# Patient Record
Sex: MALE | Race: WHITE | NOT HISPANIC OR LATINO | URBAN - METROPOLITAN AREA
[De-identification: names, ages, dates, MRNs, and addresses within clinical notes are randomized per-mention and may not be internally consistent; named-entity substitution may affect disease eponyms.]

---

## 2017-10-30 ENCOUNTER — EMERGENCY (EMERGENCY)
Facility: HOSPITAL | Age: 57
LOS: 1 days | Discharge: HOME | End: 2017-10-30
Attending: EMERGENCY MEDICINE
Payer: MEDICARE

## 2017-10-30 VITALS
HEART RATE: 85 BPM | RESPIRATION RATE: 18 BRPM | OXYGEN SATURATION: 97 % | HEIGHT: 68 IN | DIASTOLIC BLOOD PRESSURE: 70 MMHG | SYSTOLIC BLOOD PRESSURE: 102 MMHG | WEIGHT: 177.91 LBS | TEMPERATURE: 98 F

## 2017-10-30 PROBLEM — Z00.00 ENCOUNTER FOR PREVENTIVE HEALTH EXAMINATION: Status: ACTIVE | Noted: 2017-10-30

## 2017-10-30 PROCEDURE — 99284 EMERGENCY DEPT VISIT MOD MDM: CPT | Mod: 25

## 2017-10-30 PROCEDURE — 73070 X-RAY EXAM OF ELBOW: CPT | Mod: 26,RT

## 2017-10-30 PROCEDURE — 73070 X-RAY EXAM OF ELBOW: CPT

## 2017-10-30 PROCEDURE — 73110 X-RAY EXAM OF WRIST: CPT | Mod: 26,RT

## 2017-10-30 PROCEDURE — 73110 X-RAY EXAM OF WRIST: CPT

## 2017-10-30 PROCEDURE — 99283 EMERGENCY DEPT VISIT LOW MDM: CPT

## 2017-10-30 RX ORDER — IBUPROFEN 200 MG
600 TABLET ORAL ONCE
Qty: 0 | Refills: 0 | Status: COMPLETED | OUTPATIENT
Start: 2017-10-30 | End: 2017-10-30

## 2017-10-30 RX ADMIN — Medication 600 MILLIGRAM(S): at 17:37

## 2017-10-30 NOTE — ED ADULT TRIAGE NOTE - CHIEF COMPLAINT QUOTE
'On Saturday I slipped and fell on my wrist and I used heat and cold but still have wrist and arm pain. " Pt points to wrist area up to elbow area and states that is where pain is.

## 2017-10-30 NOTE — ED STATDOCS - MUSCULOSKELETAL, MLM
Tenderness and swelling over radial aspect of right wrist. Loose . Ambulatory. No midline tenderness.

## 2017-10-30 NOTE — ED STATDOCS - ATTENDING CONTRIBUTION TO CARE
I, Jose Francisco Covington, performed the initial face to face bedside interview with this patient regarding history of present illness, review of symptoms and relevant past medical, social and family history.  I completed an independent physical examination.  I was the initial provider who evaluated this patient. I have signed out the follow up of any pending tests (i.e. labs, radiological studies) to the ACP.  I have communicated the patient’s plan of care and disposition with the ACP.

## 2017-10-30 NOTE — ED STATDOCS - PLAN OF CARE
Take ibuprofen 600 mg every 6 hours as needed for pian for 3-5 days. If symptoms persist follow up w orthopedics, referral given. Return to ED w worsening pain, numbness/tingling or other worrisome symptoms.

## 2017-10-30 NOTE — ED STATDOCS - PROGRESS NOTE DETAILS
PA NOTE: Pt seen by intake physician and HPI/ROS/PE/MDM reviewed. Pt seen and evaluated. Discussed plan and any resulted studies at this time. On exam pt is noted without wrist TTP, has FROM right wrist. Pt has TTP volar aspect ulna at elbow. Will obtain elbow XR. Re-Evaluation: Pt informed results wnl. Pt placed in sling for comfort. Advised ibuprofen 600 mg q6h for 3-5 days. Advised if pain persists can follow up w orthopaedics within 1 week. Advised return to ED w worsening pain, numbness/tingling, weakness or other worrisome symptoms.  Pt expresses  understanding and agreement with plan of discharge and follow up. NAD noted at discharge.

## 2017-10-30 NOTE — ED STATDOCS - CARE PLAN
Principal Discharge DX:	Arm contusion  Instructions for follow-up, activity and diet:	Take ibuprofen 600 mg every 6 hours as needed for pian for 3-5 days. If symptoms persist follow up w orthopedics, referral given. Return to ED w worsening pain, numbness/tingling or other worrisome symptoms.

## 2020-05-26 ENCOUNTER — EMERGENCY (EMERGENCY)
Facility: HOSPITAL | Age: 60
LOS: 1 days | Discharge: HOME | End: 2020-05-26
Attending: EMERGENCY MEDICINE
Payer: MEDICARE

## 2020-05-26 VITALS
OXYGEN SATURATION: 96 % | WEIGHT: 190.04 LBS | HEIGHT: 67 IN | HEART RATE: 88 BPM | RESPIRATION RATE: 18 BRPM | DIASTOLIC BLOOD PRESSURE: 79 MMHG | TEMPERATURE: 99 F | SYSTOLIC BLOOD PRESSURE: 113 MMHG

## 2020-05-26 PROCEDURE — 99283 EMERGENCY DEPT VISIT LOW MDM: CPT

## 2020-05-26 PROCEDURE — 99283 EMERGENCY DEPT VISIT LOW MDM: CPT | Mod: GC

## 2020-05-26 NOTE — ED STATDOCS - ATTENDING CONTRIBUTION TO CARE
itchy rash ludy forearms for up to 2 weeks.  some relief with OTC cream. no fever. no pain.  PE:  ill defined erythematous patches and scattered inflammatory papules with excoriations to dorsal surfaces of ludy forearms suggestive of an irritative dermatitis.  no toxic striations.  A/P irritant dermatitis: treatment with triamcinolone.

## 2020-05-26 NOTE — ED STATDOCS - OBJECTIVE STATEMENT
Pt is a 58yo M with PMH of MS presenting with a rash on his bilateral forearms. He reports that he think it's poison ivy. He states for the past 2 weeks he has been having intense itching and the rash. He states its only on the forearms, is not spreading. Reports using over the counter creams but without relief. Denies any pus or discharge. Denies any fever, chills, weakness. He does not recall what he was exposed to that caused the rash.

## 2020-05-26 NOTE — ED STATDOCS - NS ED ROS FT
General: Denies fever, chills  HEENT: Denies sensory changes, sore throat  Neck: Denies neck pain, neck stiffness  Resp: Denies coughing, SOB  Cardiovascular: Denies CP, palpitations, LE edema  GI: Denies nausea, vomiting, abdominal pain, diarrhea, constipation, blood in stool  : Denies dysuria, hematuria, frequency, incontinence  MSK: Endorses arm itching  Neuro: Denies HA, dizziness, numbness, weakness  Skin: Denies rashes

## 2020-05-26 NOTE — ED STATDOCS - CLINICAL SUMMARY MEDICAL DECISION MAKING FREE TEXT BOX
Patient with likely poison ivy rash, topical dermatitis. To be given topical triamcinolone to trial for symptom relief. No signs of superinfection or cellulitis.

## 2020-05-26 NOTE — ED STATDOCS - PATIENT PORTAL LINK FT
You can access the FollowMyHealth Patient Portal offered by [facility] by registering at the following website: [email or web address]. By joining [facility]’s FollowMyHealth portal, you will also be able to view your health information using other applications (apps) compatible with our system.

## 2020-05-26 NOTE — ED STATDOCS - NSFOLLOWUPINSTRUCTIONS_ED_ALL_ED_FT
Use ointment for 2 weeks or until symptoms resolve and then 3 additional days.   Keep arms clean and dry.

## 2020-05-26 NOTE — ED STATDOCS - CPE ED EYE NORM
“You can access the FollowHealth Patient Portal, offered by [facility], by registering with the following website: [email or web address]” bilateral normal...

## 2023-02-26 ENCOUNTER — TRANSCRIPTION ENCOUNTER (OUTPATIENT)
Age: 63
End: 2023-02-26

## 2023-02-26 ENCOUNTER — INPATIENT (INPATIENT)
Facility: HOSPITAL | Age: 63
LOS: 9 days | Discharge: NURSING, REHAB OR LONG-TERM CARE | DRG: 482 | End: 2023-03-08
Attending: STUDENT IN AN ORGANIZED HEALTH CARE EDUCATION/TRAINING PROGRAM | Admitting: STUDENT IN AN ORGANIZED HEALTH CARE EDUCATION/TRAINING PROGRAM
Payer: MEDICARE

## 2023-02-26 VITALS
TEMPERATURE: 98 F | HEIGHT: 69 IN | WEIGHT: 184.97 LBS | DIASTOLIC BLOOD PRESSURE: 89 MMHG | HEART RATE: 61 BPM | OXYGEN SATURATION: 98 % | RESPIRATION RATE: 18 BRPM | SYSTOLIC BLOOD PRESSURE: 137 MMHG

## 2023-02-26 DIAGNOSIS — S72.142A DISPLACED INTERTROCHANTERIC FRACTURE OF LEFT FEMUR, INITIAL ENCOUNTER FOR CLOSED FRACTURE: ICD-10-CM

## 2023-02-26 LAB
ALBUMIN SERPL ELPH-MCNC: 4.2 G/DL — SIGNIFICANT CHANGE UP (ref 3.3–5.2)
ALP SERPL-CCNC: 73 U/L — SIGNIFICANT CHANGE UP (ref 40–120)
ALT FLD-CCNC: 17 U/L — SIGNIFICANT CHANGE UP
ANION GAP SERPL CALC-SCNC: 10 MMOL/L — SIGNIFICANT CHANGE UP (ref 5–17)
APTT BLD: 30.7 SEC — SIGNIFICANT CHANGE UP (ref 27.5–35.5)
AST SERPL-CCNC: 19 U/L — SIGNIFICANT CHANGE UP
BASOPHILS # BLD AUTO: 0.03 K/UL — SIGNIFICANT CHANGE UP (ref 0–0.2)
BASOPHILS NFR BLD AUTO: 0.3 % — SIGNIFICANT CHANGE UP (ref 0–2)
BILIRUB SERPL-MCNC: 0.3 MG/DL — LOW (ref 0.4–2)
BLD GP AB SCN SERPL QL: SIGNIFICANT CHANGE UP
BUN SERPL-MCNC: 17.8 MG/DL — SIGNIFICANT CHANGE UP (ref 8–20)
CALCIUM SERPL-MCNC: 9.1 MG/DL — SIGNIFICANT CHANGE UP (ref 8.4–10.5)
CHLORIDE SERPL-SCNC: 100 MMOL/L — SIGNIFICANT CHANGE UP (ref 96–108)
CO2 SERPL-SCNC: 26 MMOL/L — SIGNIFICANT CHANGE UP (ref 22–29)
CREAT SERPL-MCNC: 0.55 MG/DL — SIGNIFICANT CHANGE UP (ref 0.5–1.3)
EGFR: 113 ML/MIN/1.73M2 — SIGNIFICANT CHANGE UP
EOSINOPHIL # BLD AUTO: 0.39 K/UL — SIGNIFICANT CHANGE UP (ref 0–0.5)
EOSINOPHIL NFR BLD AUTO: 3.9 % — SIGNIFICANT CHANGE UP (ref 0–6)
FLUAV AG NPH QL: SIGNIFICANT CHANGE UP
FLUBV AG NPH QL: SIGNIFICANT CHANGE UP
GLUCOSE SERPL-MCNC: 128 MG/DL — HIGH (ref 70–99)
HCT VFR BLD CALC: 43.7 % — SIGNIFICANT CHANGE UP (ref 39–50)
HGB BLD-MCNC: 14.3 G/DL — SIGNIFICANT CHANGE UP (ref 13–17)
IMM GRANULOCYTES NFR BLD AUTO: 0.4 % — SIGNIFICANT CHANGE UP (ref 0–0.9)
INR BLD: 1 RATIO — SIGNIFICANT CHANGE UP (ref 0.88–1.16)
LYMPHOCYTES # BLD AUTO: 2 K/UL — SIGNIFICANT CHANGE UP (ref 1–3.3)
LYMPHOCYTES # BLD AUTO: 20 % — SIGNIFICANT CHANGE UP (ref 13–44)
MCHC RBC-ENTMCNC: 30.6 PG — SIGNIFICANT CHANGE UP (ref 27–34)
MCHC RBC-ENTMCNC: 32.7 GM/DL — SIGNIFICANT CHANGE UP (ref 32–36)
MCV RBC AUTO: 93.4 FL — SIGNIFICANT CHANGE UP (ref 80–100)
MONOCYTES # BLD AUTO: 0.55 K/UL — SIGNIFICANT CHANGE UP (ref 0–0.9)
MONOCYTES NFR BLD AUTO: 5.5 % — SIGNIFICANT CHANGE UP (ref 2–14)
NEUTROPHILS # BLD AUTO: 6.98 K/UL — SIGNIFICANT CHANGE UP (ref 1.8–7.4)
NEUTROPHILS NFR BLD AUTO: 69.9 % — SIGNIFICANT CHANGE UP (ref 43–77)
PLATELET # BLD AUTO: 240 K/UL — SIGNIFICANT CHANGE UP (ref 150–400)
POTASSIUM SERPL-MCNC: 4.8 MMOL/L — SIGNIFICANT CHANGE UP (ref 3.5–5.3)
POTASSIUM SERPL-SCNC: 4.8 MMOL/L — SIGNIFICANT CHANGE UP (ref 3.5–5.3)
PROT SERPL-MCNC: 6.7 G/DL — SIGNIFICANT CHANGE UP (ref 6.6–8.7)
PROTHROM AB SERPL-ACNC: 11.6 SEC — SIGNIFICANT CHANGE UP (ref 10.5–13.4)
RBC # BLD: 4.68 M/UL — SIGNIFICANT CHANGE UP (ref 4.2–5.8)
RBC # FLD: 12.2 % — SIGNIFICANT CHANGE UP (ref 10.3–14.5)
RSV RNA NPH QL NAA+NON-PROBE: SIGNIFICANT CHANGE UP
SARS-COV-2 RNA SPEC QL NAA+PROBE: SIGNIFICANT CHANGE UP
SODIUM SERPL-SCNC: 136 MMOL/L — SIGNIFICANT CHANGE UP (ref 135–145)
WBC # BLD: 9.99 K/UL — SIGNIFICANT CHANGE UP (ref 3.8–10.5)
WBC # FLD AUTO: 9.99 K/UL — SIGNIFICANT CHANGE UP (ref 3.8–10.5)

## 2023-02-26 PROCEDURE — 71045 X-RAY EXAM CHEST 1 VIEW: CPT | Mod: 26

## 2023-02-26 PROCEDURE — 73552 X-RAY EXAM OF FEMUR 2/>: CPT | Mod: 26,LT

## 2023-02-26 PROCEDURE — 99223 1ST HOSP IP/OBS HIGH 75: CPT | Mod: 57

## 2023-02-26 PROCEDURE — 99285 EMERGENCY DEPT VISIT HI MDM: CPT

## 2023-02-26 PROCEDURE — 73502 X-RAY EXAM HIP UNI 2-3 VIEWS: CPT | Mod: 26,LT

## 2023-02-26 PROCEDURE — 93010 ELECTROCARDIOGRAM REPORT: CPT

## 2023-02-26 RX ORDER — OXYCODONE HYDROCHLORIDE 5 MG/1
5 TABLET ORAL
Refills: 0 | Status: DISCONTINUED | OUTPATIENT
Start: 2023-02-26 | End: 2023-02-27

## 2023-02-26 RX ORDER — SERTRALINE 25 MG/1
50 TABLET, FILM COATED ORAL DAILY
Refills: 0 | Status: DISCONTINUED | OUTPATIENT
Start: 2023-02-26 | End: 2023-02-27

## 2023-02-26 RX ORDER — TRANEXAMIC ACID 100 MG/ML
1000 INJECTION, SOLUTION INTRAVENOUS ONCE
Refills: 0 | Status: COMPLETED | OUTPATIENT
Start: 2023-02-26 | End: 2023-02-26

## 2023-02-26 RX ORDER — TAMSULOSIN HYDROCHLORIDE 0.4 MG/1
1 CAPSULE ORAL
Qty: 0 | Refills: 0 | DISCHARGE

## 2023-02-26 RX ORDER — ONDANSETRON 8 MG/1
4 TABLET, FILM COATED ORAL EVERY 6 HOURS
Refills: 0 | Status: DISCONTINUED | OUTPATIENT
Start: 2023-02-26 | End: 2023-02-27

## 2023-02-26 RX ORDER — MORPHINE SULFATE 50 MG/1
4 CAPSULE, EXTENDED RELEASE ORAL ONCE
Refills: 0 | Status: DISCONTINUED | OUTPATIENT
Start: 2023-02-26 | End: 2023-02-26

## 2023-02-26 RX ORDER — TAMSULOSIN HYDROCHLORIDE 0.4 MG/1
0.4 CAPSULE ORAL AT BEDTIME
Refills: 0 | Status: DISCONTINUED | OUTPATIENT
Start: 2023-02-26 | End: 2023-02-27

## 2023-02-26 RX ORDER — GLATIRAMER ACETATE 20 MG/ML
40 INJECTION, SOLUTION SUBCUTANEOUS
Qty: 0 | Refills: 0 | DISCHARGE

## 2023-02-26 RX ORDER — OXYCODONE HYDROCHLORIDE 5 MG/1
10 TABLET ORAL
Refills: 0 | Status: DISCONTINUED | OUTPATIENT
Start: 2023-02-26 | End: 2023-02-27

## 2023-02-26 RX ORDER — MUPIROCIN 20 MG/G
1 OINTMENT TOPICAL
Refills: 0 | Status: DISCONTINUED | OUTPATIENT
Start: 2023-02-26 | End: 2023-02-27

## 2023-02-26 RX ORDER — GLATIRAMER ACETATE 20 MG/ML
40 INJECTION, SOLUTION SUBCUTANEOUS
Refills: 0 | Status: DISCONTINUED | OUTPATIENT
Start: 2023-02-26 | End: 2023-02-27

## 2023-02-26 RX ORDER — ENOXAPARIN SODIUM 100 MG/ML
40 INJECTION SUBCUTANEOUS ONCE
Refills: 0 | Status: COMPLETED | OUTPATIENT
Start: 2023-02-26 | End: 2023-02-26

## 2023-02-26 RX ORDER — SODIUM CHLORIDE 9 MG/ML
1000 INJECTION INTRAMUSCULAR; INTRAVENOUS; SUBCUTANEOUS
Refills: 0 | Status: DISCONTINUED | OUTPATIENT
Start: 2023-02-26 | End: 2023-02-27

## 2023-02-26 RX ORDER — VANCOMYCIN HCL 1 G
1250 VIAL (EA) INTRAVENOUS ONCE
Refills: 0 | Status: COMPLETED | OUTPATIENT
Start: 2023-02-27 | End: 2023-02-27

## 2023-02-26 RX ORDER — POVIDONE-IODINE 5 %
1 AEROSOL (ML) TOPICAL ONCE
Refills: 0 | Status: DISCONTINUED | OUTPATIENT
Start: 2023-02-26 | End: 2023-02-27

## 2023-02-26 RX ORDER — HYDROMORPHONE HYDROCHLORIDE 2 MG/ML
4 INJECTION INTRAMUSCULAR; INTRAVENOUS; SUBCUTANEOUS
Refills: 0 | Status: DISCONTINUED | OUTPATIENT
Start: 2023-02-26 | End: 2023-02-27

## 2023-02-26 RX ORDER — CHLORHEXIDINE GLUCONATE 213 G/1000ML
1 SOLUTION TOPICAL DAILY
Refills: 0 | Status: DISCONTINUED | OUTPATIENT
Start: 2023-02-26 | End: 2023-02-27

## 2023-02-26 RX ORDER — ACETAMINOPHEN 500 MG
650 TABLET ORAL EVERY 6 HOURS
Refills: 0 | Status: DISCONTINUED | OUTPATIENT
Start: 2023-02-26 | End: 2023-02-27

## 2023-02-26 RX ORDER — SERTRALINE 25 MG/1
1 TABLET, FILM COATED ORAL
Qty: 0 | Refills: 0 | DISCHARGE

## 2023-02-26 RX ADMIN — ONDANSETRON 4 MILLIGRAM(S): 8 TABLET, FILM COATED ORAL at 23:06

## 2023-02-26 RX ADMIN — ENOXAPARIN SODIUM 40 MILLIGRAM(S): 100 INJECTION SUBCUTANEOUS at 17:01

## 2023-02-26 RX ADMIN — SODIUM CHLORIDE 150 MILLILITER(S): 9 INJECTION INTRAMUSCULAR; INTRAVENOUS; SUBCUTANEOUS at 23:10

## 2023-02-26 RX ADMIN — MORPHINE SULFATE 4 MILLIGRAM(S): 50 CAPSULE, EXTENDED RELEASE ORAL at 14:39

## 2023-02-26 RX ADMIN — Medication 650 MILLIGRAM(S): at 23:07

## 2023-02-26 RX ADMIN — MORPHINE SULFATE 4 MILLIGRAM(S): 50 CAPSULE, EXTENDED RELEASE ORAL at 15:09

## 2023-02-26 RX ADMIN — TAMSULOSIN HYDROCHLORIDE 0.4 MILLIGRAM(S): 0.4 CAPSULE ORAL at 23:06

## 2023-02-26 RX ADMIN — TRANEXAMIC ACID 220 MILLIGRAM(S): 100 INJECTION, SOLUTION INTRAVENOUS at 17:01

## 2023-02-26 NOTE — PATIENT PROFILE ADULT - FALL HARM RISK - HARM RISK INTERVENTIONS

## 2023-02-26 NOTE — PATIENT PROFILE ADULT - DO YOU LACK THE NECESSARY SUPPORT TO HELP YOU COPE WITH LIFE CHALLENGES?
Spoke with patient. She has been out of Lantus for over a week because she can not get script filled because when sugars were running high she increased on her own and was doing 30 units twice daily and increased Humalog 24 units twice daily. She still is taking the Humalog. Asked why she wasn't taking this three times daily and she said she thought you told her to only take twice daily. Has not been taking her Trulicity either as she said it does not work and she would like to go back on Ozempic as this controlled her blood sugars much better but she switched because she has nausea with it but states she would like to go back on despite this. 1.What dose of Lantus and Humalog do you want her on? We probably need to increase her Lantus so she can get a new script. 2.Also send in [address] as well. 3. Patient states Amitriptyline is not working to help her sleep and would like to be prescribed Trazadone. no

## 2023-02-26 NOTE — H&P ADULT - ASSESSMENT
Left hip IT fx  Patient will require orthopedic surgical intervention   Patient will be medically optimized and likely go to OR tomorrow 2/27/23 with Dr Escalante   Pre Op meds/fluids and NPO orders   Fluids while NPO

## 2023-02-26 NOTE — ED PROVIDER NOTE - WR ORDER ID 4
Your opinion matters! Thank you for choosing Dr. Padmini Cervantes at [facility]. You may receive a survey in the mail about today's visit.  We always appreciate feedback.  It was a pleasure to care for you today!    Dr. Cervantes's Staff:    Santiago is the Patient   Lanny is the Medical Assistant  Maritza is the Nurse   [id number]

## 2023-02-26 NOTE — ED ADULT TRIAGE NOTE - CHIEF COMPLAINT QUOTE
pt a+ox3, BIBA s/p trip and fall. pt states he was walking into garage, tip of boot got caught on step and he fell forward, directly onto left hip. LLE shortened and externally rotated.

## 2023-02-26 NOTE — ED PROVIDER NOTE - NS ED ROS FT
Constitutional: no fever, no chills  Head: NC, AT   Eyes: no redness   ENMT: no nasal congestion/drainage, no sore throat   CV: no chest pain, no edema  Resp: no cough, no dyspnea  GI: no abdominal pain, no nausea, no vomiting, no diarrhea  : no dysuria, no hematuria   Skin: no lesions, no rashes   Neuro: no LOC, no headache, no sensory deficits, no weakness  MSK: left hip pain,

## 2023-02-26 NOTE — ED PROVIDER NOTE - OBJECTIVE STATEMENT
62 y m with pmh of MS presenting for fall today after tripping on concrete lip of garage. Pt denies any head trauma, blood thinners, loc. No cp, sob, n/v, ab pain, f/c. Now having left sided hip pain. Denies any change in strength or sensation in left LE.

## 2023-02-26 NOTE — H&P ADULT - NSHPPHYSICALEXAM_GEN_ALL_CORE
Left leg shortened and externally rotated. Pulses intact and appreciated, Calves soft, supple and nontender. Pain with attempted log roll

## 2023-02-26 NOTE — ED ADULT NURSE REASSESSMENT NOTE - NS ED NURSE REASSESS COMMENT FT1
Patient reports an improvement in presence of pain. Aware of POC and admission for surgery tomorrow. Medicated as per orders. Awaiting call back from Rn on [address].

## 2023-02-26 NOTE — PATIENT PROFILE ADULT - PATIENT'S GENDER IDENTITY
FORM SENT TO DR. PRYOR PER PATIENT   Ortho Sports Medicine Shoulder Visit     Assesment:     right shoulder SLAP tear with small rotator cuff tear    Plan:    Conservative treatment:    Will send patient to see Dr Naomie Davis for right shoulder glenohumeral joint cortisone injection to see if this offers more relief than subacromial injections  See patient back in 2-3 months, if no significant improvement then may consider moving forward with surgical procedure  Maintain, HEP, nsaids as needed for pain  Imaging:    No imaging was available for review today  Injection:    No Injection planned at this time  Surgery:     No surgery is recommended at this point, continue with conservative treatment plan as noted  History of Present Illness: The patient is returns for follow up of his right shoulder, treating for SLAP tear with small rotator cuff tear  He was given CSI 7/2/21 which offered relief until about 2 weeks ago  Pain is primarily during day with work, repetitive motions, overhead activities bother his shoulder  Pain posterior-anterior shoulder  Pain is improved by rest, ice and NSAIDS  Pain is aggravated by overhead activity, reaching back and rotation  The patient denies weakness  The patient has tried rest, ice, NSAIDS, physical therapy and injection  I have reviewed the past medical, surgical, social and family history, medications and allergies as documented in the EMR  Review of systems: ROS is negative other than that noted in the HPI  Constitutional: Negative for fatigue and fever     Cardiovascular: Negative for chest pain  Pulmonary: negative for shortness of breath    PMH/PSH:  Past Medical History:   Diagnosis Date    Diabetes mellitus ([facility] [address] )     Hypertension      Past Surgical History:   Procedure Laterality Date    ELBOW SURGERY      Left    FL INJECTION RIGHT SHOULDER (ARTHROGRAM)  1/5/2021        Physical Exam:    Height 5' 9" (1 753 m), weight 113 kg (250 lb)     General/Constitutional: NAD, well developed, well nourished  HENT: Normocephalic, atraumatic  CV: Intact distal pulses, regular rate  Resp: No respiratory distress or labored breathing  Lymphatic: No lymphadenopathy palpated  Neuro: Alert and Oriented x 3, no focal deficits  Psych: Normal mood, normal affect, normal judgement, normal behavior  Skin: Warm, dry, no rashes, no erythema     Shoulder Exam (focused): Shoulder focused exam:       RIGHT LEFT    Scapula Atrophy Negative Negative     Winging Negative Negative     Protraction Negative Negative    Rotator cuff SS 5/5 5/5     IS 5/5 5/5     SubS 5/5 5/5    ROM  170     ER0 60 60     ER90 90 90     IR90 40 40     IRb T6 T6    TTP: AC Negative Negative     Biceps Negative Negative     Coracoid Negative Negative    Special Tests: O'Briens Negative Negative     Rodriguez-shear Negative Negative     Cross body Adduction Negative Negative     Speeds  Negative Negative     Elizabeth's Negative Negative     Whipple Negative Negative       Neer Negative Negative     Francisco Negative Negative    Instability: Apprehension & relocation not tested not tested     Load & shift not tested not tested    Other: Crank Negative Negative               UE NV Exam: +2 Radial pulses bilaterally  Sensation intact to light touch C5-T1 bilaterally, Radial/median/ulnar nerve motor intact    Cervical ROM is full without pain, numbness or tingling      Shoulder Imaging    No new imaging to review         Scribe Attestation    I,:  Dev Roy am acting as a scribe while in the presence of the attending physician :       I,:  Sheeba Mcdonough,  personally performed the services described in this documentation    as scribed in my presence : Male

## 2023-02-26 NOTE — H&P ADULT - NSHPLABSRESULTS_GEN_ALL_CORE
ACC: [id number] EXAM:  XR PELVIS COMPLETE MIN 3 VIEWS   ORDERED BY: MICHELLE SEGUNDO     ACC: [id number] EXAM:  XR HIP 2-3V LT   ORDERED BY: MICHELLE SEGUNDO     PROCEDURE DATE: 02/26/2023        INTERPRETATION:  History: Fall with hip pain and shortness.    FINDINGS:    Frontal pelvis  Frontal left hip  Frontal and crosstable lateral left femur:    Left intertrochanteric hip fracture with angulated foreshortening   displacement.    No hip dislocation.    No other pelvic fractures appreciated.    Distal femur intact.    IMPRESSION:    Left intertrochanteric hip fracture with angulated foreshortening   displacement.        --- End of Report ---        ALISON MELISSA MD; Attending Radiologist  This document has been electronically signed. Feb 26 2023  4:21PM

## 2023-02-26 NOTE — ED PROVIDER NOTE - ATTENDING CONTRIBUTION TO CARE
Janak: I performed a face to face evaluation of this patient and performed a full history and physical examination on the patient.  I agree with the resident's history, physical examination, and plan of the patient unless otherwise noted. My brief assessment is as follows: hx MS present s/p trip after getting toe caught in uneven surface fell onto left side. denies hitting head, no a/c. c/o pain to left hip only. shortened and externally rotated. no numbness/tingling. no other complaints. ncat, no midline neck/spine ttp, ctab, rrr, abd benign, ttp left lateral hip with rotation/shortening. nl pulse distally. xrays, pain control, preop labs, likely ortho and admit.

## 2023-02-26 NOTE — H&P ADULT - HISTORY OF PRESENT ILLNESS
Patient states he tripped in his garage today and had pain in is left hip/leg. Patient was unable to WB. Called his neighbor for help and had an ambulance called. Patient states he was diagnosed with MS at age 19. He continues to ambulate with a quad cane. He suffers from short term memory loss and he states he feels its progressing. Patient was forgetful and had hard time recalling his medications and allergies, yet a few moments later was able to recall allergy to erythromycin

## 2023-02-26 NOTE — PRE-ANESTHESIA EVALUATION ADULT - NSANTHADDINFOFT_GEN_ALL_CORE
Patient with pmh of multiple sclerosis (ambulate with a cane) reports short term memory loss. Labs, imaging, and ekg reviewed. Patient ok to proceed to the OR. Plan as per DOS anesthesiologist.

## 2023-02-26 NOTE — ED PROVIDER NOTE - PHYSICAL EXAMINATION
General: well appearing, NAD  Head: NC, AT  EENT: EOMI, no scleral icterus  Cardiac: RRR, no apparent murmurs, no lower extremity edema  Respiratory: CTABL, no respiratory distress   Abdomen: soft, ND, NT, nonperitonitic  MSK/Vascular: full ROM, soft compartments, warm extremities, left hip tenderness over upper thigh/lateral hip, 2+ DP pulse BL, no change in sensation, able to fully dorsiflect toes, small partially healed abrasion but no new skin changes at left hip.   Neuro: AAOx3, sensation to light touch intact  Psych: calm, cooperative

## 2023-02-26 NOTE — ED PROVIDER NOTE - CLINICAL SUMMARY MEDICAL DECISION MAKING FREE TEXT BOX
Likely fractured hip or proximal femur. Getting labs xrays and pain control Likely fractured hip or proximal femur. Getting labs xrays and pain control.    Xray showed intertrochanteric fracture. Continues to be neurovascularly intact. Admitting to ortho.

## 2023-02-26 NOTE — CHART NOTE - NSCHARTNOTEFT_GEN_A_CORE
PA NOTE-MEDICINE    Called by RN due to Pt Vomiting up water.  Pt states that he often feels Nauseous and vomits at home which self resolves.  61 yo Male PMHX: Multiple Sclerosis ambulates with walker fell in his Garage and felt pain to Left hip   BIBA  Diagnosed with Left intertrochanteric hip fracture with angulated foreshortening displacement.  As per Ortho Note:   Patient will require orthopedic surgical intervention   Patient will be medically optimized and likely go to OR tomorrow 2/27/23 with Dr Escalante   Pre Op meds/fluids and NPO orders     T(C): 36.8 (26 Feb 2023 21:25), Max: 36.8 (26 Feb 2023 18:41)  T(F): 98.3 (26 Feb 2023 18:41), Max: 98.3 (26 Feb 2023 18:41)  HR: 71 (26 Feb 2023 21:25) (58 - 71)  BP: 112/76 (26 Feb 2023 21:25) (112/76 - 137/89)  RR: 18 (26 Feb 2023 21:25) (18 - 18)  SpO2: 95% (26 Feb 2023 21:25) (95% - 98%) ra     General: WD WN Male sitting up in Bed NAD States + Nausea - Drank water which he vomited within a few minutes (Clear Vomitus) Denies: Abd Pain, Diarrhea No Past abd sx history Denies Difficulty swallowing   Cardiac: S1S2 + RRR  Lungs: CTA B/L A-B  Abd: NDNT Soft No Guarding, Rigidity, Rebound + BS x 4 Q   Integument: No Pallor Warm/Dry   Ext: as per Ortho     A/P Eval Pt 2/2 Vomiting up water (Multiple episodes)   NPO  Zofran already prescribed Prn-asked RN to administer dose   Continue to Monitor Pt  Recall PA if Vomiting continues or for any other changes in Pt status   Will sign out to AM Team PA NOTE-MEDICINE    Called by RN due to Pt Vomiting up water.  Pt states that he often feels Nauseous and vomits at home which self resolves.  63 yo Male PMHX: Multiple Sclerosis ambulates with walker fell in his Garage and felt pain to Left hip   BIBA  Diagnosed with Left intertrochanteric hip fracture with angulated foreshortening displacement.  As per Ortho Note:   Patient will require orthopedic surgical intervention   Patient will be medically optimized and likely go to OR tomorrow 2/27/23 with Dr Escalante   Pre Op meds/fluids and NPO orders     T(C): 36.8 (26 Feb 2023 21:25), Max: 36.8 (26 Feb 2023 18:41)  T(F): 98.3 (26 Feb 2023 18:41), Max: 98.3 (26 Feb 2023 18:41)  HR: 71 (26 Feb 2023 21:25) (58 - 71)  BP: 112/76 (26 Feb 2023 21:25) (112/76 - 137/89)  RR: 18 (26 Feb 2023 21:25) (18 - 18)  SpO2: 95% (26 Feb 2023 21:25) (95% - 98%) ra     General: WD WN Male sitting up in Bed NAD States + Nausea - Drank water which he vomited within a few minutes (Clear Vomitus) Denies: Abd Pain, Diarrhea No Past abd sx history Denies Difficulty swallowing   Cardiac: S1S2 + RRR  Lungs: CTA B/L A-B  Abd: NDNT Soft No Guarding, Rigidity, Rebound + BS x 4 Q   Integument: No Pallor Warm/Dry   Ext: as per Ortho     A/P Eval Pt 2/2 Vomiting up water (Multiple episodes)   NPO  Zofran already prescribed Prn-asked RN to administer dose   Continue to Monitor Pt  Recall PA if Vomiting continues or for any other changes in Pt status   Will call for formal Medicine consult   Will sign out to AM Team

## 2023-02-27 ENCOUNTER — TRANSCRIPTION ENCOUNTER (OUTPATIENT)
Age: 63
End: 2023-02-27

## 2023-02-27 LAB
MRSA PCR RESULT.: SIGNIFICANT CHANGE UP
S AUREUS DNA NOSE QL NAA+PROBE: SIGNIFICANT CHANGE UP

## 2023-02-27 PROCEDURE — 27245 TREAT THIGH FRACTURE: CPT | Mod: LT

## 2023-02-27 PROCEDURE — 27095 INJECTION FOR HIP X-RAY: CPT | Mod: LT

## 2023-02-27 PROCEDURE — 93010 ELECTROCARDIOGRAM REPORT: CPT

## 2023-02-27 PROCEDURE — 99222 1ST HOSP IP/OBS MODERATE 55: CPT

## 2023-02-27 DEVICE — KIT SYRINGE TRAUMACEM V PLUS STRL: Type: IMPLANTABLE DEVICE | Site: LEFT | Status: FUNCTIONAL

## 2023-02-27 DEVICE — GRAFT BONE INJ CANN TRAUMACEM FOR TFNA: Type: IMPLANTABLE DEVICE | Site: LEFT | Status: FUNCTIONAL

## 2023-02-27 DEVICE — NAIL TFNA 130DEG 10X170MM: Type: IMPLANTABLE DEVICE | Site: LEFT | Status: FUNCTIONAL

## 2023-02-27 DEVICE — GRAFT BONE INJ TRAUMACEM TM V PLUS BONE CEMENT: Type: IMPLANTABLE DEVICE | Site: LEFT | Status: FUNCTIONAL

## 2023-02-27 DEVICE — SCREW LOKG 5X36MM: Type: IMPLANTABLE DEVICE | Site: LEFT | Status: FUNCTIONAL

## 2023-02-27 DEVICE — BLADE TFNA HELICAL 100MM STRL: Type: IMPLANTABLE DEVICE | Site: LEFT | Status: FUNCTIONAL

## 2023-02-27 RX ORDER — ENOXAPARIN SODIUM 100 MG/ML
40 INJECTION SUBCUTANEOUS EVERY 24 HOURS
Refills: 0 | Status: DISCONTINUED | OUTPATIENT
Start: 2023-02-28 | End: 2023-03-08

## 2023-02-27 RX ORDER — FENTANYL CITRATE 50 UG/ML
25 INJECTION INTRAVENOUS
Refills: 0 | Status: DISCONTINUED | OUTPATIENT
Start: 2023-02-27 | End: 2023-02-27

## 2023-02-27 RX ORDER — TAMSULOSIN HYDROCHLORIDE 0.4 MG/1
0.4 CAPSULE ORAL AT BEDTIME
Refills: 0 | Status: DISCONTINUED | OUTPATIENT
Start: 2023-02-27 | End: 2023-03-08

## 2023-02-27 RX ORDER — CEFAZOLIN SODIUM 1 G
2000 VIAL (EA) INJECTION ONCE
Refills: 0 | Status: DISCONTINUED | OUTPATIENT
Start: 2023-02-27 | End: 2023-02-27

## 2023-02-27 RX ORDER — GLATIRAMER ACETATE 20 MG/ML
40 INJECTION, SOLUTION SUBCUTANEOUS
Refills: 0 | Status: DISCONTINUED | OUTPATIENT
Start: 2023-02-27 | End: 2023-03-08

## 2023-02-27 RX ORDER — SODIUM CHLORIDE 9 MG/ML
1000 INJECTION, SOLUTION INTRAVENOUS
Refills: 0 | Status: DISCONTINUED | OUTPATIENT
Start: 2023-02-27 | End: 2023-02-27

## 2023-02-27 RX ORDER — ONDANSETRON 8 MG/1
4 TABLET, FILM COATED ORAL ONCE
Refills: 0 | Status: DISCONTINUED | OUTPATIENT
Start: 2023-02-27 | End: 2023-02-27

## 2023-02-27 RX ORDER — TRANEXAMIC ACID 100 MG/ML
1000 INJECTION, SOLUTION INTRAVENOUS ONCE
Refills: 0 | Status: DISCONTINUED | OUTPATIENT
Start: 2023-02-27 | End: 2023-02-27

## 2023-02-27 RX ORDER — SODIUM CHLORIDE 9 MG/ML
1000 INJECTION, SOLUTION INTRAVENOUS
Refills: 0 | Status: DISCONTINUED | OUTPATIENT
Start: 2023-02-27 | End: 2023-02-28

## 2023-02-27 RX ORDER — OXYCODONE HYDROCHLORIDE 5 MG/1
10 TABLET ORAL EVERY 4 HOURS
Refills: 0 | Status: DISCONTINUED | OUTPATIENT
Start: 2023-02-27 | End: 2023-02-27

## 2023-02-27 RX ORDER — HYDROMORPHONE HYDROCHLORIDE 2 MG/ML
4 INJECTION INTRAMUSCULAR; INTRAVENOUS; SUBCUTANEOUS EVERY 4 HOURS
Refills: 0 | Status: DISCONTINUED | OUTPATIENT
Start: 2023-02-27 | End: 2023-02-27

## 2023-02-27 RX ORDER — ONDANSETRON 8 MG/1
4 TABLET, FILM COATED ORAL EVERY 6 HOURS
Refills: 0 | Status: DISCONTINUED | OUTPATIENT
Start: 2023-02-27 | End: 2023-03-08

## 2023-02-27 RX ORDER — OXYCODONE HYDROCHLORIDE 5 MG/1
5 TABLET ORAL ONCE
Refills: 0 | Status: DISCONTINUED | OUTPATIENT
Start: 2023-02-27 | End: 2023-02-27

## 2023-02-27 RX ORDER — ACETAMINOPHEN 500 MG
650 TABLET ORAL EVERY 6 HOURS
Refills: 0 | Status: DISCONTINUED | OUTPATIENT
Start: 2023-02-27 | End: 2023-03-08

## 2023-02-27 RX ORDER — SERTRALINE 25 MG/1
50 TABLET, FILM COATED ORAL DAILY
Refills: 0 | Status: DISCONTINUED | OUTPATIENT
Start: 2023-02-27 | End: 2023-03-08

## 2023-02-27 RX ORDER — OXYCODONE HYDROCHLORIDE 5 MG/1
5 TABLET ORAL EVERY 4 HOURS
Refills: 0 | Status: DISCONTINUED | OUTPATIENT
Start: 2023-02-27 | End: 2023-02-28

## 2023-02-27 RX ORDER — CEFAZOLIN SODIUM 1 G
2000 VIAL (EA) INJECTION
Refills: 0 | Status: COMPLETED | OUTPATIENT
Start: 2023-02-27 | End: 2023-02-28

## 2023-02-27 RX ADMIN — Medication 166.67 MILLIGRAM(S): at 11:58

## 2023-02-27 RX ADMIN — Medication 650 MILLIGRAM(S): at 11:57

## 2023-02-27 RX ADMIN — SODIUM CHLORIDE 100 MILLILITER(S): 9 INJECTION, SOLUTION INTRAVENOUS at 21:26

## 2023-02-27 RX ADMIN — Medication 650 MILLIGRAM(S): at 07:19

## 2023-02-27 RX ADMIN — GLATIRAMER ACETATE 40 MILLIGRAM(S): 20 INJECTION, SOLUTION SUBCUTANEOUS at 10:30

## 2023-02-27 RX ADMIN — TAMSULOSIN HYDROCHLORIDE 0.4 MILLIGRAM(S): 0.4 CAPSULE ORAL at 21:26

## 2023-02-27 RX ADMIN — Medication 2000 MILLIGRAM(S): at 21:26

## 2023-02-27 RX ADMIN — Medication 650 MILLIGRAM(S): at 06:19

## 2023-02-27 RX ADMIN — MUPIROCIN 1 APPLICATION(S): 20 OINTMENT TOPICAL at 06:18

## 2023-02-27 RX ADMIN — Medication 650 MILLIGRAM(S): at 00:07

## 2023-02-27 RX ADMIN — SODIUM CHLORIDE 150 MILLILITER(S): 9 INJECTION INTRAMUSCULAR; INTRAVENOUS; SUBCUTANEOUS at 06:18

## 2023-02-27 NOTE — DISCHARGE NOTE PROVIDER - PROVIDER TOKENS
PROVIDER:[TOKEN:[[id number]]] PROVIDER:[TOKEN:[[id number]]],PROVIDER:[TOKEN:[[id number]]]

## 2023-02-27 NOTE — CONSULT NOTE ADULT - ASSESSMENT
61 y/o M with Hx of MS on Copaxone 3 times a week, he uses cane to help him walk, he tripped in his garage on concrete floor and started having pain in is left hip/leg, he called his neighbor for help and had an ambulance called, he denies any head injury or neck pain, has no chest pain, sob, medicine consulted for medical optimization for surgery, patient denies any Hx of exertional dyspnea or chest pain, his METS score is 2, RCRI is 0, patient has stable vitals, labs reviewed, patient is at intermediate risk for perioperative cardiovascular complications and is optimized from the medicine point of view for planned procedure, TTE ordered, will follow before proceeding for procedure.     Plan:     Left hip fracture due to mechanical fall:     Pain meds and DVT prophylaxis as per primary team   IV fluids if NPO   Bowel meds   IS    Hx of MS: continue with his home medication.

## 2023-02-27 NOTE — CHART NOTE - NSCHARTNOTEFT_GEN_A_CORE
Orthopaedic Trauma Surgeon Addendum:    I have personally performed a face-to-face diagnostic evaluation on this patient.  I have reviewed the physician assistant note and agree with the history, exam, and plan of care, except as noted.    Planning for OR today 2/27 for IMN left IT fx    Gamal Rouse MD  Orthopaedic Trauma Surgeon  [facility]

## 2023-02-27 NOTE — PROGRESS NOTE ADULT - SUBJECTIVE AND OBJECTIVE BOX
Ortho Post Op Check    Name: IOANA LITTLE  MR #: [id number]    Procedure: Left Anterograde Femur intramedullary nail fixation   Surgeon: Dr. Rouse    Pt comfortable without complaints, pain controlled  Denies CP, SOB, N/V, numbness/tingling     General Exam:  Vital Signs Last 24 Hrs  T(C): 36.7 (02-27-23 @ 18:13), Max: 36.9 (02-27-23 @ 17:45)  T(F): 98.1 (02-27-23 @ 18:13), Max: 98.5 (02-27-23 @ 17:45)  HR: 77 (02-27-23 @ 18:13) (70 - 78)  BP: 153/92 (02-27-23 @ 18:13) (114/73 - 153/92)  BP(mean): 104 (02-27-23 @ 17:45) (84 - 104)  RR: 18 (02-27-23 @ 18:13) (12 - 20)  SpO2: 96% (02-27-23 @ 18:13) (96% - 99%)    General: Pt Alert and oriented, NAD, controlled pain.  Dressings C/D/I. No bleeding.  Pulses: 2+ dorsalis pedis pulse. Cap refill < 2 sec.  Sensation: Grossly intact to light touch without deficit.  Motor: + EHL/FHL/TA/GS    _ talbot present due to retention      A/P: 62yMale POD#0 s/p Left Hip intramedullary nail fixation   - Pain Control  - DVT ppx: Lovenox 2/28  - Post op abx: as per SCIP  - PT eval pending  - Talbot as per medicine  - Weight bearing status: WBAT Ortho Post Op Check    Name: IOANA LITTLE  MR #: [id number]    Procedure: Left Anterograde Femur intramedullary nail fixation   Surgeon: Dr. Rouse    Pt comfortable without complaints, pain controlled  Denies CP, SOB, N/V, numbness/tingling     General Exam:  Vital Signs Last 24 Hrs  T(C): 36.7 (02-27-23 @ 18:13), Max: 36.9 (02-27-23 @ 17:45)  T(F): 98.1 (02-27-23 @ 18:13), Max: 98.5 (02-27-23 @ 17:45)  HR: 77 (02-27-23 @ 18:13) (70 - 78)  BP: 153/92 (02-27-23 @ 18:13) (114/73 - 153/92)  BP(mean): 104 (02-27-23 @ 17:45) (84 - 104)  RR: 18 (02-27-23 @ 18:13) (12 - 20)  SpO2: 96% (02-27-23 @ 18:13) (96% - 99%)    General: Pt Alert and oriented, NAD, controlled pain.  Dressings C/D/I. No bleeding.  Pulses: 2+ dorsalis pedis pulse. Cap refill < 2 sec.  Sensation: Grossly intact to light touch without deficit.  Motor: + EHL/FHL/TA/GS    _ talbot present due to urinary retention      A/P: 62yMale POD#0 s/p Left Hip intramedullary nail fixation   - Pain Control  - DVT ppx: Lovenox 2/28  - Post op abx: as per SCIP  - PT eval pending  - Talbot as per medicine  - Weight bearing status: WBAT

## 2023-02-27 NOTE — DISCHARGE NOTE PROVIDER - CARE PROVIDER_API CALL
Gamal Rouse)  Orthopaedic Surgery  [address] [place] [address]  Phone: [phone]  Fax: [phone]  Follow Up Time:    Gamal Rouse)  Orthopaedic Surgery  [address] [place] [address]  Phone: [phone]  Fax: [phone]  Follow Up Time:     Regis Ladd)  Urology  [address], [address]  [place] [address]  Phone: [phone]  Fax: [phone]  Follow Up Time:

## 2023-02-27 NOTE — DISCHARGE NOTE PROVIDER - NSDCCPTREATMENT_GEN_ALL_CORE_FT
PRINCIPAL PROCEDURE  Procedure: Antegrade intertrochanteric nailing of femur  Findings and Treatment:

## 2023-02-27 NOTE — DISCHARGE NOTE PROVIDER - HOSPITAL COURSE
The patient underwent a LEFT INTRAMEDULARY NAIL FIXATION on 2/28 for treatment of a hip fracture. The patient received antibiotics consistent with SCIP guidelines. The patient was medically cleared and underwent the procedure and had no intra-operative complications. Post-operatively, the patient was seen by medicine and PT. The patient received LOVENOX for DVTP. The patient received pain medications per orthopedic pain management pathway and the pain was appropriately controlled. Patient was evaluated by PT and instructed on gait training. The patient was FULL weight bearing. The patient did not have any post-operative medical complications. The patient was discharged in stable condition.

## 2023-02-27 NOTE — DISCHARGE NOTE PROVIDER - NSDCFUADDINST_GEN_ALL_CORE_FT
The patient will be seen in the office between 2-3 weeks for wound check. Patient may shower after post-op day #3. The dressing is to be removed on post-op day #7. IF THE DRESSING BECOMES SOILED BEFORE THE REMOVAL DATE, CHANGE WITH A SIMILAR DRESSING. IF THE DRESSING BECOMES STAINED WITH DISCHARGE, CONTACT THE OFFICE FOR FURTHER DIRECTIONS.  The patient will contact the office if the wound becomes red, has increasing pain, develops bleeding or discharge, an injury occurs, or has other concerns. The patient will continue PT for gait training. The patient will continue LOVENOX for 4 weeks for blood clot prevention. The patient will take OXYCODONE AND TYLENOL for pain control and titrate according to prescription and patient needs. The patient will take Senna-S while taking oxycodone to prevent narcotic associated constipation.  Additionally, increase water intake (drink at least 8 glasses of water daily) and try adding fiber to the diet by eating fruits, vegetables and foods that are rich in grains. If constipation is experienced, contact the medical/primary care provider to discuss further treatment options. The patient is FULL weight bearing.  The patient will be seen in the office between 2-3 weeks for wound check. Patient may shower after post-op day #3. The dressing is to be removed on 3/13. IF THE DRESSING BECOMES SOILED BEFORE THE REMOVAL DATE, CHANGE WITH A SIMILAR DRESSING. IF THE DRESSING BECOMES STAINED WITH DISCHARGE, CONTACT THE OFFICE FOR FURTHER DIRECTIONS.  The patient will contact the office if the wound becomes red, has increasing pain, develops bleeding or discharge, an injury occurs, or has other concerns. The patient will continue PT for gait training. The patient will continue LOVENOX for 4 weeks for blood clot prevention. The patient will take OXYCODONE AND TYLENOL for pain control and titrate according to prescription and patient needs. The patient will take Senna-S while taking oxycodone to prevent narcotic associated constipation.  Additionally, increase water intake (drink at least 8 glasses of water daily) and try adding fiber to the diet by eating fruits, vegetables and foods that are rich in grains. If constipation is experienced, contact the medical/primary care provider to discuss further treatment options. The patient is FULL weight bearing.  The patient will be seen in the office between 2-3 weeks for wound check. Patient may shower after post-op day #3. The dressing is to be removed on 3/13. IF THE DRESSING BECOMES SOILED BEFORE THE REMOVAL DATE, CHANGE WITH A SIMILAR DRESSING. IF THE DRESSING BECOMES STAINED WITH DISCHARGE, CONTACT THE OFFICE FOR FURTHER DIRECTIONS.  The patient will contact the office if the wound becomes red, has increasing pain, develops bleeding or discharge, an injury occurs, or has other concerns. The patient will continue PT for gait training. The patient will continue LOVENOX for 4 weeks for blood clot prevention. The patient will take OXYCODONE AND TYLENOL for pain control and titrate according to prescription and patient needs. The patient will take Senna-S while taking oxycodone to prevent narcotic associated constipation.  Additionally, increase water intake (drink at least 8 glasses of water daily) and try adding fiber to the diet by eating fruits, vegetables and foods that are rich in grains. If constipation is experienced, contact the medical/primary care provider to discuss further treatment options. The patient is FULL weight bearing.       Follow-up with UROLOGIST WITHIN 3 days of discharge for talbot management.

## 2023-02-27 NOTE — DISCHARGE NOTE PROVIDER - CARE PROVIDERS DIRECT ADDRESSES
,[email or web address]

## 2023-02-27 NOTE — DISCHARGE NOTE PROVIDER - NSDCMRMEDTOKEN_GEN_ALL_CORE_FT
Copaxone 40 mg/mL subcutaneous solution: 40 milligram(s) subcutaneous 3 times a week  sertraline 50 mg oral tablet: 1 tab(s) orally once a day  tamsulosin 0.4 mg oral capsule: 1 cap(s) orally once a day  triamcinolone 0.1% topical ointment: Apply topically to affected area 3 times a day    acetaminophen 325 mg oral tablet: 2 tab(s) orally every 6 hours, As needed, Temp greater or equal to 38C (100.4F)  Copaxone 40 mg/mL subcutaneous solution: 40 milligram(s) subcutaneous 3 times a week  enoxaparin: 40 milligram(s) subcutaneous once a day for 4 weeks post-op for DVTP  oxyCODONE 10 mg oral tablet: 1 tab(s) orally every 4 hours, As needed, Moderate Pain (4 - 6)  oxyCODONE 5 mg oral tablet: 1 tab(s) orally every 4 hours, As needed, Mild Pain (1 - 3)  sertraline 50 mg oral tablet: 1 tab(s) orally once a day  tamsulosin 0.4 mg oral capsule: 1 cap(s) orally once a day  triamcinolone 0.1% topical ointment: Apply topically to affected area 3 times a day

## 2023-02-28 LAB
ANION GAP SERPL CALC-SCNC: 12 MMOL/L — SIGNIFICANT CHANGE UP (ref 5–17)
BASOPHILS # BLD AUTO: 0.02 K/UL — SIGNIFICANT CHANGE UP (ref 0–0.2)
BASOPHILS NFR BLD AUTO: 0.1 % — SIGNIFICANT CHANGE UP (ref 0–2)
BUN SERPL-MCNC: 11.2 MG/DL — SIGNIFICANT CHANGE UP (ref 8–20)
CALCIUM SERPL-MCNC: 9 MG/DL — SIGNIFICANT CHANGE UP (ref 8.4–10.5)
CHLORIDE SERPL-SCNC: 101 MMOL/L — SIGNIFICANT CHANGE UP (ref 96–108)
CO2 SERPL-SCNC: 25 MMOL/L — SIGNIFICANT CHANGE UP (ref 22–29)
CREAT SERPL-MCNC: 0.65 MG/DL — SIGNIFICANT CHANGE UP (ref 0.5–1.3)
EGFR: 108 ML/MIN/1.73M2 — SIGNIFICANT CHANGE UP
EOSINOPHIL # BLD AUTO: 0.01 K/UL — SIGNIFICANT CHANGE UP (ref 0–0.5)
EOSINOPHIL NFR BLD AUTO: 0.1 % — SIGNIFICANT CHANGE UP (ref 0–6)
GLUCOSE SERPL-MCNC: 120 MG/DL — HIGH (ref 70–99)
HCT VFR BLD CALC: 34.8 % — LOW (ref 39–50)
HCV AB S/CO SERPL IA: 0.04 S/CO — SIGNIFICANT CHANGE UP (ref 0–0.99)
HCV AB SERPL-IMP: SIGNIFICANT CHANGE UP
HGB BLD-MCNC: 11.5 G/DL — LOW (ref 13–17)
IMM GRANULOCYTES NFR BLD AUTO: 0.4 % — SIGNIFICANT CHANGE UP (ref 0–0.9)
LYMPHOCYTES # BLD AUTO: 1.72 K/UL — SIGNIFICANT CHANGE UP (ref 1–3.3)
LYMPHOCYTES # BLD AUTO: 12.6 % — LOW (ref 13–44)
MCHC RBC-ENTMCNC: 30.7 PG — SIGNIFICANT CHANGE UP (ref 27–34)
MCHC RBC-ENTMCNC: 33 GM/DL — SIGNIFICANT CHANGE UP (ref 32–36)
MCV RBC AUTO: 92.8 FL — SIGNIFICANT CHANGE UP (ref 80–100)
MONOCYTES # BLD AUTO: 1.03 K/UL — HIGH (ref 0–0.9)
MONOCYTES NFR BLD AUTO: 7.6 % — SIGNIFICANT CHANGE UP (ref 2–14)
NEUTROPHILS # BLD AUTO: 10.8 K/UL — HIGH (ref 1.8–7.4)
NEUTROPHILS NFR BLD AUTO: 79.2 % — HIGH (ref 43–77)
PLATELET # BLD AUTO: 207 K/UL — SIGNIFICANT CHANGE UP (ref 150–400)
POTASSIUM SERPL-MCNC: 4.9 MMOL/L — SIGNIFICANT CHANGE UP (ref 3.5–5.3)
POTASSIUM SERPL-SCNC: 4.9 MMOL/L — SIGNIFICANT CHANGE UP (ref 3.5–5.3)
RBC # BLD: 3.75 M/UL — LOW (ref 4.2–5.8)
RBC # FLD: 12.3 % — SIGNIFICANT CHANGE UP (ref 10.3–14.5)
SODIUM SERPL-SCNC: 138 MMOL/L — SIGNIFICANT CHANGE UP (ref 135–145)
WBC # BLD: 13.63 K/UL — HIGH (ref 3.8–10.5)
WBC # FLD AUTO: 13.63 K/UL — HIGH (ref 3.8–10.5)

## 2023-02-28 PROCEDURE — 99232 SBSQ HOSP IP/OBS MODERATE 35: CPT

## 2023-02-28 RX ADMIN — SERTRALINE 50 MILLIGRAM(S): 25 TABLET, FILM COATED ORAL at 10:28

## 2023-02-28 RX ADMIN — SODIUM CHLORIDE 100 MILLILITER(S): 9 INJECTION, SOLUTION INTRAVENOUS at 05:30

## 2023-02-28 RX ADMIN — ONDANSETRON 4 MILLIGRAM(S): 8 TABLET, FILM COATED ORAL at 01:24

## 2023-02-28 RX ADMIN — ENOXAPARIN SODIUM 40 MILLIGRAM(S): 100 INJECTION SUBCUTANEOUS at 05:29

## 2023-02-28 RX ADMIN — OXYCODONE HYDROCHLORIDE 5 MILLIGRAM(S): 5 TABLET ORAL at 22:42

## 2023-02-28 RX ADMIN — Medication 2000 MILLIGRAM(S): at 05:29

## 2023-02-28 NOTE — PROGRESS NOTE ADULT - SUBJECTIVE AND OBJECTIVE BOX
IOANA ANABEL    [id number]    History:  The patient is status post left hip IMN, POD #1. Patient is doing well. The patient's pain is controlled using the prescribed pain medications. Denies nausea, vomiting, chest pain, shortness of breath, abdominal pain or fever. No new complaints. No acute motor or sensory changes are reported. Unable to void. Had straight cath last night. Still retaining this morning and attempting to void on own. Will get talbot if unable to void.     Vital Signs Last 24 Hrs  T(C): 37.2 (28 Feb 2023 03:33), Max: 37.2 (27 Feb 2023 21:55)  T(F): 99 (28 Feb 2023 03:33), Max: 99 (28 Feb 2023 03:33)  HR: 88 (28 Feb 2023 03:33) (67 - 88)  BP: 113/70 (28 Feb 2023 03:33) (109/78 - 153/92)  BP(mean): 104 (27 Feb 2023 17:45) (84 - 104)  RR: 18 (28 Feb 2023 03:33) (12 - 20)  SpO2: 93% (28 Feb 2023 03:33) (93% - 99%)    Parameters below as of 28 Feb 2023 03:33  Patient On (Oxygen Delivery Method): room air      I&O's Summary    27 Feb 2023 07:01  -  28 Feb 2023 07:00  --------------------------------------------------------  IN: 150 mL / OUT: 1600 mL / NET: -1450 mL                              11.5   13.63 )-----------( 207      ( 28 Feb 2023 05:18 )             34.8     02-28    138  |  101  |  11.2  ----------------------------<  120<H>  4.9   |  25.0  |  0.65    Ca    9.0      28 Feb 2023 05:18    TPro  6.7  /  Alb  4.2  /  TBili  0.3<L>  /  DBili  x   /  AST  19  /  ALT  17  /  AlkPhos  73  02-26      MEDICATIONS  (STANDING):  enoxaparin Injectable 40 milliGRAM(s) SubCutaneous every 24 hours  glatiramer Injectable 40 milliGRAM(s) SubCutaneous <User Schedule>  lactated ringers. 1000 milliLiter(s) (100 mL/Hr) IV Continuous <Continuous>  sertraline 50 milliGRAM(s) Oral daily  tamsulosin 0.4 milliGRAM(s) Oral at bedtime    MEDICATIONS  (PRN):  acetaminophen     Tablet .. 650 milliGRAM(s) Oral every 6 hours PRN Temp greater or equal to 38C (100.4F)  HYDROmorphone   Tablet 4 milliGRAM(s) Oral every 4 hours PRN Severe Pain (7 - 10)  ondansetron Injectable 4 milliGRAM(s) IV Push every 6 hours PRN Nausea and/or Vomiting  oxyCODONE    IR 10 milliGRAM(s) Oral every 4 hours PRN Moderate Pain (4 - 6)  oxyCODONE    IR 5 milliGRAM(s) Oral every 4 hours PRN Mild Pain (1 - 3)      Physical exam: Lying in bed in NAD, awake and alert.  Left lower extremity- The mepilex dressings are clean, dry and intact. No wound erythema, discharge, drainage is noted. Thigh soft and compressible. Calf soft. No calf tenderness. Sensation to light touch is grossly intact distally. Motor function distally is 5/5. +EHL/FHL. No foot drop. 2+ dorsalis pedis pulse. Capillary refill is less than 2 seconds. No cyanosis.    Primary Orthopedic Assessment:  • S/P left hp IMN, POD#1    Plan:   • DVT prophylaxis as prescribed- Lovenox, including use of compression devices and ankle pumps  • Continue physical therapy  • WBAT  • Pain control as clinically indicated  • Incentive spirometry encouraged  - medicine following  - Una  • Discharge planning

## 2023-02-28 NOTE — OCCUPATIONAL THERAPY INITIAL EVALUATION ADULT - ADDITIONAL COMMENTS
Pt has tub with doors and grab bars and grab bars by toilet   Pt owns a shower chair, quad cane, RW  Pt is right handed

## 2023-02-28 NOTE — PROGRESS NOTE ADULT - ASSESSMENT
61 y/o M with Hx of MS on Copaxone 3 times a week, he uses cane to help him walk, he tripped in his garage on concrete floor and started having pain in is left hip/leg, he called his neighbor for help and had an ambulance called, he denies any head injury or neck pain, has no chest pain, sob, medicine consulted for medical optimization for surgery, patient denies any Hx of exertional dyspnea or chest pain, his METS score is 2, RCRI is 0, patient has stable vitals, labs reviewed, patient is at intermediate risk for perioperative cardiovascular complications and is optimized from the medicine point of view for planned procedure, TTE ordered, will follow before proceeding for procedure.     Plan:     Left hip fracture due to mechanical fall s/p THR post op day 01:     - post op no complications  - VS stable  - abx per ortho   - IV fluids   - opiate induced constipation regimen   - encouraging incentive spirometry   -c/w local wound care per ortho   -DVT prophylaxis and Pain meds as per Ortho team   -PT/OT and weight bearing per ortho    IS    Hx of MS: continue with his home medication.     Leucocytosis: Likely post op, no focus of infection, no fever, no chills.      63 y/o M with Hx of MS on Copaxone 3 times a week, he uses cane to help him walk, he tripped in his garage on concrete floor and started having pain in is left hip/leg, he called his neighbor for help and had an ambulance called, he denies any head injury or neck pain, has no chest pain, sob, medicine consulted for medical optimization for surgery, patient denies any Hx of exertional dyspnea or chest pain, his METS score is 2, RCRI is 0, patient has stable vitals, labs reviewed, patient is at intermediate risk for perioperative cardiovascular complications and is optimized from the medicine point of view for planned procedure, TTE ordered, will follow before proceeding for procedure.     Plan:     Left hip fracture due to mechanical fall s/p THR post op day 01:     - post op no complications  - VS stable  - abx per ortho   - IV fluids   - opiate induced constipation regimen   - encouraging incentive spirometry   -c/w local wound care per ortho   -DVT prophylaxis and Pain meds as per Ortho team   -PT/OT and weight bearing per ortho    IS    Hx of MS: continue with his home medication.     Leucocytosis: Likely post op, no focus of infection, no fever, no chills.     BPH: On Flomax has urine retention s/p straight cath, unable to void, talbot's placed.

## 2023-02-28 NOTE — PROGRESS NOTE ADULT - SUBJECTIVE AND OBJECTIVE BOX
IOANA LITTLE    [id number]    62y      Male    Patient is a 62y old  Male who presents with a chief complaint of fall (28 Feb 2023 08:52)      INTERVAL HPI/OVERNIGHT EVENTS:    Patient is doing ok, pain is well managed with pain medications, working well with PT     REVIEW OF SYSTEMS:    CONSTITUTIONAL: No fever, fatigue  RESPIRATORY: No cough, No shortness of breath  CARDIOVASCULAR: No chest pain, palpitations  GASTROINTESTINAL: No abdominal, No nausea, vomiting  NEUROLOGICAL: No headaches,  loss of strength.  MISCELLANEOUS: Left hip pain is well controlled       Vital Signs Last 24 Hrs  T(C): 36.8 (28 Feb 2023 10:36), Max: 37.2 (27 Feb 2023 21:55)  T(F): 98.2 (28 Feb 2023 10:36), Max: 99 (28 Feb 2023 03:33)  HR: 98 (28 Feb 2023 10:36) (70 - 98)  BP: 90/55 (28 Feb 2023 10:36) (90/55 - 153/92)  BP(mean): 104 (27 Feb 2023 17:45) (84 - 104)  RR: 18 (28 Feb 2023 10:36) (12 - 20)  SpO2: 93% (28 Feb 2023 10:36) (93% - 99%)    Parameters below as of 28 Feb 2023 10:36  Patient On (Oxygen Delivery Method): room air        PHYSICAL EXAM:    GENERAL: Middle age male looking comfortable   HEENT: PERRL, +EOMI  NECK: soft, Supple, No JVD   CHEST/LUNG: Clear to auscultate bilaterally; No wheezing  HEART: S1S2+, Regular rate and rhythm; No murmurs  ABDOMEN: Soft, Nontender, Nondistended; Bowel sounds present  EXTREMITIES:  1+ Peripheral Pulses, No edema, left hip with clean dressings on, no bleeding or soaking   SKIN: No rashes or lesions  NEURO: AAOX3  PSYCH: normal mood      LABS:                        11.5   13.63 )-----------( 207      ( 28 Feb 2023 05:18 )             34.8     02-28    138  |  101  |  11.2  ----------------------------<  120<H>  4.9   |  25.0  |  0.65    Ca    9.0      28 Feb 2023 05:18    TPro  6.7  /  Alb  4.2  /  TBili  0.3<L>  /  DBili  x   /  AST  19  /  ALT  17  /  AlkPhos  73  02-26    PT/INR - ( 26 Feb 2023 14:42 )   PT: 11.6 sec;   INR: 1.00 ratio         PTT - ( 26 Feb 2023 14:42 )  PTT:30.7 sec        I&O's Summary    27 Feb 2023 07:01  -  28 Feb 2023 07:00  --------------------------------------------------------  IN: 150 mL / OUT: 1600 mL / NET: -1450 mL    28 Feb 2023 07:01  -  28 Feb 2023 11:56  --------------------------------------------------------  IN: 980 mL / OUT: 0 mL / NET: 980 mL        MEDICATIONS  (STANDING):  enoxaparin Injectable 40 milliGRAM(s) SubCutaneous every 24 hours  glatiramer Injectable 40 milliGRAM(s) SubCutaneous <User Schedule>  lactated ringers. 1000 milliLiter(s) (100 mL/Hr) IV Continuous <Continuous>  sertraline 50 milliGRAM(s) Oral daily  tamsulosin 0.4 milliGRAM(s) Oral at bedtime    MEDICATIONS  (PRN):  acetaminophen     Tablet .. 650 milliGRAM(s) Oral every 6 hours PRN Temp greater or equal to 38C (100.4F)  HYDROmorphone   Tablet 4 milliGRAM(s) Oral every 4 hours PRN Severe Pain (7 - 10)  ondansetron Injectable 4 milliGRAM(s) IV Push every 6 hours PRN Nausea and/or Vomiting  oxyCODONE    IR 10 milliGRAM(s) Oral every 4 hours PRN Moderate Pain (4 - 6)  oxyCODONE    IR 5 milliGRAM(s) Oral every 4 hours PRN Mild Pain (1 - 3)

## 2023-03-01 LAB
ANION GAP SERPL CALC-SCNC: 10 MMOL/L — SIGNIFICANT CHANGE UP (ref 5–17)
BUN SERPL-MCNC: 13.3 MG/DL — SIGNIFICANT CHANGE UP (ref 8–20)
CALCIUM SERPL-MCNC: 8.3 MG/DL — LOW (ref 8.4–10.5)
CHLORIDE SERPL-SCNC: 100 MMOL/L — SIGNIFICANT CHANGE UP (ref 96–108)
CO2 SERPL-SCNC: 26 MMOL/L — SIGNIFICANT CHANGE UP (ref 22–29)
CREAT SERPL-MCNC: 0.56 MG/DL — SIGNIFICANT CHANGE UP (ref 0.5–1.3)
EGFR: 113 ML/MIN/1.73M2 — SIGNIFICANT CHANGE UP
GLUCOSE BLDC GLUCOMTR-MCNC: 112 MG/DL — HIGH (ref 70–99)
GLUCOSE SERPL-MCNC: 117 MG/DL — HIGH (ref 70–99)
POTASSIUM SERPL-MCNC: 3.4 MMOL/L — LOW (ref 3.5–5.3)
POTASSIUM SERPL-SCNC: 3.4 MMOL/L — LOW (ref 3.5–5.3)
SODIUM SERPL-SCNC: 136 MMOL/L — SIGNIFICANT CHANGE UP (ref 135–145)

## 2023-03-01 PROCEDURE — 99232 SBSQ HOSP IP/OBS MODERATE 35: CPT

## 2023-03-01 PROCEDURE — 99223 1ST HOSP IP/OBS HIGH 75: CPT

## 2023-03-01 RX ORDER — ACETAMINOPHEN 500 MG
2 TABLET ORAL
Qty: 0 | Refills: 0 | DISCHARGE
Start: 2023-03-01

## 2023-03-01 RX ORDER — METOCLOPRAMIDE HCL 10 MG
5 TABLET ORAL ONCE
Refills: 0 | Status: COMPLETED | OUTPATIENT
Start: 2023-03-01 | End: 2023-03-01

## 2023-03-01 RX ORDER — POTASSIUM CHLORIDE 20 MEQ
40 PACKET (EA) ORAL ONCE
Refills: 0 | Status: COMPLETED | OUTPATIENT
Start: 2023-03-01 | End: 2023-03-01

## 2023-03-01 RX ORDER — OXYCODONE HYDROCHLORIDE 5 MG/1
1 TABLET ORAL
Qty: 0 | Refills: 0 | DISCHARGE
Start: 2023-03-01

## 2023-03-01 RX ORDER — SODIUM CHLORIDE 9 MG/ML
1000 INJECTION, SOLUTION INTRAVENOUS
Refills: 0 | Status: DISCONTINUED | OUTPATIENT
Start: 2023-03-01 | End: 2023-03-08

## 2023-03-01 RX ORDER — ENOXAPARIN SODIUM 100 MG/ML
40 INJECTION SUBCUTANEOUS
Qty: 0 | Refills: 0 | DISCHARGE
Start: 2023-03-01

## 2023-03-01 RX ADMIN — Medication 5 MILLIGRAM(S): at 11:53

## 2023-03-01 RX ADMIN — SERTRALINE 50 MILLIGRAM(S): 25 TABLET, FILM COATED ORAL at 11:54

## 2023-03-01 RX ADMIN — TAMSULOSIN HYDROCHLORIDE 0.4 MILLIGRAM(S): 0.4 CAPSULE ORAL at 21:11

## 2023-03-01 RX ADMIN — ONDANSETRON 4 MILLIGRAM(S): 8 TABLET, FILM COATED ORAL at 09:49

## 2023-03-01 RX ADMIN — Medication 40 MILLIEQUIVALENT(S): at 16:34

## 2023-03-01 RX ADMIN — ENOXAPARIN SODIUM 40 MILLIGRAM(S): 100 INJECTION SUBCUTANEOUS at 05:32

## 2023-03-01 RX ADMIN — GLATIRAMER ACETATE 40 MILLIGRAM(S): 20 INJECTION, SOLUTION SUBCUTANEOUS at 09:45

## 2023-03-01 RX ADMIN — SODIUM CHLORIDE 100 MILLILITER(S): 9 INJECTION, SOLUTION INTRAVENOUS at 12:38

## 2023-03-01 NOTE — CONSULT NOTE ADULT - SUBJECTIVE AND OBJECTIVE BOX
62yM was admitted on 02-26 after a mechanical fall and sustaining a left IT fracture. He is s/p IMN.     Imaging Reviewed:  LEFT HIP & LEFT FEMUR 2/26 - Left intertrochanteric hip fracture with angulated foreshortening   displacement.    CXR 2/26 - Unremarkable frontal chest x ray    TTE 2/27 -  1. Overall normal LV size, low normal systolic function with limited   imaging, est EF 50%. No focal wall motion abnormalities detected.   2. Left ventricular ejection fraction, by visual estimation, is 50%.   3. Normal global left ventricular systolic function.   4. Spectral Doppler shows impaired relaxation pattern of left   ventricular myocardial filling (Grade I diastolic dysfunction).   5. There is no evidence of pericardial effusion.   6. No evidence of mitral valve regurgitation.   7. Sclerotic aortic valve with normal opening.   8. Mild aortic regurgitation.   9. Mild pulmonic valve regurgitation.  10. Dilatation of the aortic root and ascending aorta.  11. Maximum aortic root dimension 4.0 cm.  ----------------------------  Patient reports that he has left hip pain that is localized with movement/standing.       REVIEW OF SYSTEMS  Constitutional - No fever, No weight loss, No fatigue  HEENT - No eye pain, No visual disturbances, No difficulty hearing, No tinnitus, No vertigo, No neck pain  Respiratory - No cough, No wheezing, No shortness of breath  Cardiovascular - No chest pain, No palpitations  Gastrointestinal - No abdominal pain, No nausea, No vomiting, No diarrhea, No constipation  Genitourinary - No dysuria, No frequency, No hematuria, No incontinence  Neurological - No headaches, No memory loss, +loss of strength, No numbness, No tremors  Skin - No itching, No rashes, No lesions   Endocrine - No temperature intolerance  Musculoskeletal - +joint pain, No joint swelling, +muscle pain  Psychiatric - No depression, No anxiety    VITALS  T(C): 37.5 (03-01-23 @ 04:23), Max: 37.6 (02-28-23 @ 20:58)  HR: 93 (03-01-23 @ 04:23) (79 - 98)  BP: 94/64 (03-01-23 @ 04:23) (90/55 - 114/75)  RR: 16 (03-01-23 @ 04:23) (16 - 18)  SpO2: 91% (03-01-23 @ 04:23) (91% - 94%)  Wt(kg): --    PAST MEDICAL & SURGICAL HISTORY  Multiple sclerosis    No significant past surgical history         FUNCTIONAL HISTORY  Lives alone, 2 CATHRYN  Independent with [facility]    CURRENT FUNCTIONAL STATUS  2/27 PT  Bed Mobility: Rolling/Turning:     · Level of [place]	minimum assist (75% patients effort)    Bed Mobility: Sit to Supine:     · Level of [place]	moderate assist (50% patients effort)    Bed Mobility: Supine to Sit:     · Level of [place]	moderate assist (50% patients effort)    Transfer: Sit to Stand:     · Level of [place]	moderate assist (50% patients effort)    Transfer: Stand to Sit:     · Level of [place]	moderate assist (50% patients effort)    Gait Skills:     · Level of [place]	1 step RW modA    Gait Analysis:     · Gait Pattern Used	pt declining further ambulation due to pain, decreased ludy step length    Stair Negotiation:     · Level of [place]	held for safety    2/27 OT  Bathing Training:     · Level of [place]	moderate assist (50% patients effort); seated  · Physical Assist/Nonphysical Assist	1 person assist    Upper Body Dressing Training:     · Level of [place]	minimum assist (75% patients effort); moderate assist (50% patients effort); to don gown  · Physical Assist/Nonphysical Assist	1 person assist    Lower Body Dressing Training:     · Level of [place]	maximum assist (25% patients effort); to don socks  · Physical Assist/Nonphysical Assist	1 person assist    Toilet Hygiene Training:     · Level of [place]	dependent (less than 25% patients effort); secondary to catheter talbot    Grooming Training:     · Level of [place]	supervision  · Physical Assist/Nonphysical Assist	supervision    Eating/Self-Feeding Training:     · Level of [place]	independent      RECENT LABS/IMAGING  REVIEWED    CBC Full  -  ( 28 Feb 2023 05:18 )  WBC Count : 13.63 K/uL  RBC Count : 3.75 M/uL  Hemoglobin : 11.5 g/dL  Hematocrit : 34.8 %  Platelet Count - Automated : 207 K/uL  Mean Cell Volume : 92.8 fl  Mean Cell Hemoglobin : 30.7 pg  Mean Cell Hemoglobin Concentration : 33.0 gm/dL  Auto Neutrophil # : 10.80 K/uL  Auto Lymphocyte # : 1.72 K/uL  Auto Monocyte # : 1.03 K/uL  Auto Eosinophil # : 0.01 K/uL  Auto Basophil # : 0.02 K/uL  Auto Neutrophil % : 79.2 %  Auto Lymphocyte % : 12.6 %  Auto Monocyte % : 7.6 %  Auto Eosinophil % : 0.1 %  Auto Basophil % : 0.1 %    03-01    136  |  100  |  13.3  ----------------------------<  117<H>  3.4<L>   |  26.0  |  0.56    Ca    8.3<L>      01 Mar 2023 05:15          ALLERGIES  erythromycin (Unknown)      MEDICATIONS   acetaminophen     Tablet .. 650 milliGRAM(s) Oral every 6 hours PRN  enoxaparin Injectable 40 milliGRAM(s) SubCutaneous every 24 hours  glatiramer Injectable 40 milliGRAM(s) SubCutaneous <User Schedule>  HYDROmorphone   Tablet 4 milliGRAM(s) Oral every 4 hours PRN  ondansetron Injectable 4 milliGRAM(s) IV Push every 6 hours PRN  oxyCODONE    IR 10 milliGRAM(s) Oral every 4 hours PRN  oxyCODONE    IR 5 milliGRAM(s) Oral every 4 hours PRN  sertraline 50 milliGRAM(s) Oral daily  tamsulosin 0.4 milliGRAM(s) Oral at bedtime          ----------------------------------------------------------------------------------------  PHYSICAL EXAM  Constitutional - NAD, Comfortable  HEENT - NCAT, EOMI  Neck - Supple, No limited ROM  Chest - Breathing comfortably, No wheezing  Cardiovascular - S1S2   Abdomen - Soft   Extremities - Left LE swelling  Neurologic Exam -                    Cognitive - AAO to self, place, date, year, situation     Communication - Fluent, No dysarthria     Cranial Nerves - CN 2-12 intact     FUNCTIONAL MOTOR EXAM -                     LEFT    UE - ShAB 5/5, EF 5/5, EE 5/5, WE 5/5,  5/5                    RIGHT UE - ShAB 5/5, EF 5/5, EE 5/5, WE 5/5,  5/5                    LEFT    LE - HF 1/5, KE 1/5, DF 5/5, PF 5/5                    RIGHT LE - HF 4/5, KE 4/5, DF 5/5, PF 5/5        Sensory - Intact to LT  Psychiatric - Mood stable, Affect WNL  ----------------------------------------------------------------------------------------  ASSESSMENT/PLAN  62yMale with functional deficits after a fall sustaining a hip fracture  Left IT fracture s/p IMN - WBAT  MS - Copaxone   Mood - Zoloft  Pain - Tylenol, Dilaudid, Oxycodone  DVT PPX - SCDs, Lovenox  Rehab - Recommend ACUTE inpatient rehabilitation for the functional deficits consisting of 3 hours of therapy/day & 24 hour RN/daily PMR physician for comorbid medical management. Will continue to follow for ongoing rehab needs and recommendations. Patient will be able to tolerate 3 hours a day.    Continue bedside therapy as well as OOB throughout the day with mobilization throughout the day with staff to maintain cardiopulmonary function and prevention of secondary complications related to debility.      Will continue to follow. Rehab recommendations are dependent on how functional progress changes as well as how patient continues to participate and tolerate therapeutic interventions, which may change. Recommend ongoing mobilization by staff to maintain cardiopulmonary function and prevention of secondary complications related to debility. Discussed management with rehab clinical care team/rehab liaison.    Total Time Spent on Encounter (reviewing clinical notes, labs, radiology, medications, patient history/exam, assessment and plan) - 75 minutes  
61 y/o M with Hx of MS on Copaxone 3 times a week, he uses cane to help him walk, he tripped in his garage on concrete floor and started having pain in is left hip/leg, he called his neighbor for help and had an ambulance called, he denies any head injury or neck pain, has no chest pain, sob, dizziness, denies having any fever, chills, nausea, vomiting.     Allergies:  	erythromycin: Drug, Unknown    Home Medications:   * Patient Currently Takes Medications as of 26-May-2020 14:45 documented in Structured Notes  · 	triamcinolone 0.1% topical ointment: Apply topically to affected area 3 times a day       PAST MEDICAL HISTORY:  Multiple sclerosis.     PAST SURGICAL HISTORY:  No significant past surgical history.    Social History: Not a smoker, drinker or using any drugs       REVIEW OF SYSTEMS:    CONSTITUTIONAL: No fever, fatigue  RESPIRATORY: No cough, No shortness of breath  CARDIOVASCULAR: No chest pain, palpitations  GASTROINTESTINAL: No abdominal, No nausea, vomiting  NEUROLOGICAL: No headaches,  loss of strength.  MISCELLANEOUS: left hip pain controlled with pain meds       Vital Signs Last 24 Hrs  T(C): 37 (27 Feb 2023 08:47), Max: 37.4 (26 Feb 2023 23:20)  T(F): 98.6 (27 Feb 2023 08:47), Max: 99.4 (26 Feb 2023 23:20)  HR: 69 (27 Feb 2023 08:47) (58 - 77)  BP: 108/60 (27 Feb 2023 08:47) (108/60 - 137/89)  RR: 17 (27 Feb 2023 08:47) (17 - 18)  SpO2: 94% (27 Feb 2023 08:47) (94% - 98%)    Parameters below as of 27 Feb 2023 08:47  Patient On (Oxygen Delivery Method): room air        PHYSICAL EXAM:    GENERAL: Middle age male looking comfortable   HEENT: PERRL, +EOMI  NECK: soft, Supple, No JVD   CHEST/LUNG: Clear to auscultate bilaterally; No wheezing  HEART: S1S2+, Regular rate and rhythm; No murmurs  ABDOMEN: Soft, Nontender, Nondistended; Bowel sounds present  EXTREMITIES:  1+ Peripheral Pulses, No edema  SKIN: No rashes or lesions  NEURO: AAOX3  PSYCH: normal mood      LABS:                        14.3   9.99  )-----------( 240      ( 26 Feb 2023 14:42 )             43.7     02-26    136  |  100  |  17.8  ----------------------------<  128<H>  4.8   |  26.0  |  0.55    Ca    9.1      26 Feb 2023 14:42    TPro  6.7  /  Alb  4.2  /  TBili  0.3<L>  /  DBili  x   /  AST  19  /  ALT  17  /  AlkPhos  73  02-26    PT/INR - ( 26 Feb 2023 14:42 )   PT: 11.6 sec;   INR: 1.00 ratio         PTT - ( 26 Feb 2023 14:42 )  PTT:30.7 sec        I&O's Summary    26 Feb 2023 07:01  -  27 Feb 2023 07:00  --------------------------------------------------------  IN: 0 mL / OUT: 200 mL / NET: -200 mL        MEDICATIONS  (STANDING):  acetaminophen     Tablet .. 650 milliGRAM(s) Oral every 6 hours  chlorhexidine 2% Cloths 1 Application(s) Topical daily  glatiramer Injectable 40 milliGRAM(s) SubCutaneous <User Schedule>  mupirocin 2% Ointment 1 Application(s) Both Nostrils two times a day  povidone iodine 5% Nasal Swab 1 Application(s) Both Nostrils once  sertraline 50 milliGRAM(s) Oral daily  sodium chloride 0.9%. 1000 milliLiter(s) (150 mL/Hr) IV Continuous <Continuous>  tamsulosin 0.4 milliGRAM(s) Oral at bedtime  vancomycin  IVPB 1250 milliGRAM(s) IV Intermittent once    MEDICATIONS  (PRN):  HYDROmorphone   Tablet 4 milliGRAM(s) Oral every 3 hours PRN Severe Pain (7 - 10)  ondansetron Injectable 4 milliGRAM(s) IV Push every 6 hours PRN Nausea and/or Vomiting  oxyCODONE    IR 5 milliGRAM(s) Oral every 3 hours PRN Mild Pain (1 - 3)  oxyCODONE    IR 10 milliGRAM(s) Oral every 3 hours PRN Moderate Pain (4 - 6)

## 2023-03-01 NOTE — PROGRESS NOTE ADULT - SUBJECTIVE AND OBJECTIVE BOX
Pt Name: IOANA LITTLE    MRN: [id number]    Patient is a being followed for left hip IMN POD#2. Patient reports ambulating with physical therapy yesterday. Patient denies motor sensory changes. Patient denies CP, SOB, N/V, fever/chills.     PAST MEDICAL & SURGICAL HISTORY:  PAST MEDICAL & SURGICAL HISTORY:  Multiple sclerosis    No significant past surgical history    Allergies: erythromycin (Unknown)    Medications: acetaminophen     Tablet .. 650 milliGRAM(s) Oral every 6 hours PRN  enoxaparin Injectable 40 milliGRAM(s) SubCutaneous every 24 hours  glatiramer Injectable 40 milliGRAM(s) SubCutaneous <User Schedule>  HYDROmorphone   Tablet 4 milliGRAM(s) Oral every 4 hours PRN  ondansetron Injectable 4 milliGRAM(s) IV Push every 6 hours PRN  oxyCODONE    IR 10 milliGRAM(s) Oral every 4 hours PRN  oxyCODONE    IR 5 milliGRAM(s) Oral every 4 hours PRN  sertraline 50 milliGRAM(s) Oral daily  tamsulosin 0.4 milliGRAM(s) Oral at bedtime                          11.5   13.63 )-----------( 207      ( 2023 05:18 )             34.8     03-    136  |  100  |  13.3  ----------------------------<  117<H>  3.4<L>   |  26.0  |  0.56    Ca    8.3<L>      01 Mar 2023 05:15        PHYSICAL EXAM:    Vital Signs Last 24 Hrs  T(C): 37.5 (01 Mar 2023 04:23), Max: 37.6 (2023 20:58)  T(F): 99.5 (01 Mar 2023 04:23), Max: 99.7 (2023 20:58)  HR: 93 (01 Mar 2023 04:23) (79 - 98)  BP: 94/64 (01 Mar 2023 04:23) (90/55 - 114/75)  BP(mean): --  RR: 16 (01 Mar 2023 04:23) (16 - 18)  SpO2: 91% (01 Mar 2023 04:23) (91% - 94%)    Parameters below as of 01 Mar 2023 04:23  Patient On (Oxygen Delivery Method): room air    Daily     Daily Weight in k.7 (2023 23:35)    Appearance: Alert, responsive, in no acute distress.  LLE: left hip mepilex c/d/i, skin intact, no erythema, compartments soft and compressible, +PF/DF/EHL/FHL, SILT, DP intact, BCR, no cyanosis    A/P:  Pt is a  62y Male with left hip IMN POD#2    PLAN:   * WBAT  * PT/OT  * Pain Control  * DVTP  * talbot per medicine  * DIspo - rehab

## 2023-03-01 NOTE — PROGRESS NOTE ADULT - ASSESSMENT
61 y/o M with Hx of MS on Copaxone 3 times a week, he uses cane to help him walk, he tripped in his garage on concrete floor and started having pain in is left hip/leg, he called his neighbor for help and had an ambulance called, he denies any head injury or neck pain, has no chest pain, sob, medicine consulted for medical optimization for surgery, patient denies any Hx of exertional dyspnea or chest pain, his METS score is 2, RCRI is 0, patient has stable vitals, labs reviewed, patient is at intermediate risk for perioperative cardiovascular complications and is optimized from the medicine point of view for planned procedure, TTE ordered, will follow before proceeding for procedure.     Plan:     Left hip fracture due to mechanical fall s/p THR post op day 02:     - post op no complications  - VS stable  - abx per ortho   - opiate induced constipation regimen   - encouraging incentive spirometry   -c/w local wound care per ortho   -DVT prophylaxis and Pain meds as per Ortho team   -PT/OT and weight bearing per ortho    IS    Hx of MS: continue with his home medication.     Leucocytosis: Likely post op, no focus of infection, no fever, no chills.     BPH: On Flomax has urine retention s/p straight cath, unable to void, talbot's placed, he has urologist, he was told to follow with him to get voiding trial in his office.     Hypokalemia: Supplemented     Patient is stable from the medicine point of view for discharge pending PT and Ortho eval.     Medicine team is signing off, please call for any question

## 2023-03-01 NOTE — PROGRESS NOTE ADULT - SUBJECTIVE AND OBJECTIVE BOX
IOANA LITTLE    [id number]    62y      Male    Patient is a 62y old  Male who presents with a chief complaint of fall (01 Mar 2023 07:45)      INTERVAL HPI/OVERNIGHT EVENTS:    Patient is doing ok, pain is well managed with pain medications, he has talbot's cather as he was having urine retention     REVIEW OF SYSTEMS:    CONSTITUTIONAL: No fever, fatigue  RESPIRATORY: No cough, No shortness of breath  CARDIOVASCULAR: No chest pain, palpitations  GASTROINTESTINAL: No abdominal, No nausea, vomiting  NEUROLOGICAL: No headaches,  loss of strength.  MISCELLANEOUS: Left hip pain is well controlled        Vital Signs Last 24 Hrs  T(C): 37.5 (01 Mar 2023 04:23), Max: 37.6 (28 Feb 2023 20:58)  T(F): 99.5 (01 Mar 2023 04:23), Max: 99.7 (28 Feb 2023 20:58)  HR: 93 (01 Mar 2023 04:23) (79 - 98)  BP: 94/64 (01 Mar 2023 04:23) (90/55 - 114/75)  RR: 16 (01 Mar 2023 04:23) (16 - 18)  SpO2: 91% (01 Mar 2023 04:23) (91% - 94%)    Parameters below as of 01 Mar 2023 04:23  Patient On (Oxygen Delivery Method): room air        PHYSICAL EXAM:    GENERAL: Middle age male looking comfortable   HEENT: PERRL, +EOMI  NECK: soft, Supple, No JVD   CHEST/LUNG: Clear to auscultate bilaterally; No wheezing  HEART: S1S2+, Regular rate and rhythm; No murmurs  ABDOMEN: Soft, Nontender, Nondistended; Bowel sounds present  EXTREMITIES:  1+ Peripheral Pulses, No edema, left hip with clean dressings on, no bleeding or soaking   SKIN: No rashes or lesions  NEURO: AAOX3  PSYCH: normal mood      LABS:                        11.5   13.63 )-----------( 207      ( 28 Feb 2023 05:18 )             34.8     03-01    136  |  100  |  13.3  ----------------------------<  117<H>  3.4<L>   |  26.0  |  0.56    Ca    8.3<L>      01 Mar 2023 05:15              I&O's Summary    28 Feb 2023 07:01  -  01 Mar 2023 07:00  --------------------------------------------------------  IN: 1480 mL / OUT: 1100 mL / NET: 380 mL        MEDICATIONS  (STANDING):  enoxaparin Injectable 40 milliGRAM(s) SubCutaneous every 24 hours  glatiramer Injectable 40 milliGRAM(s) SubCutaneous <User Schedule>  sertraline 50 milliGRAM(s) Oral daily  tamsulosin 0.4 milliGRAM(s) Oral at bedtime    MEDICATIONS  (PRN):  acetaminophen     Tablet .. 650 milliGRAM(s) Oral every 6 hours PRN Temp greater or equal to 38C (100.4F)  HYDROmorphone   Tablet 4 milliGRAM(s) Oral every 4 hours PRN Severe Pain (7 - 10)  ondansetron Injectable 4 milliGRAM(s) IV Push every 6 hours PRN Nausea and/or Vomiting  oxyCODONE    IR 10 milliGRAM(s) Oral every 4 hours PRN Moderate Pain (4 - 6)  oxyCODONE    IR 5 milliGRAM(s) Oral every 4 hours PRN Mild Pain (1 - 3)

## 2023-03-02 LAB
ANION GAP SERPL CALC-SCNC: 10 MMOL/L — SIGNIFICANT CHANGE UP (ref 5–17)
BUN SERPL-MCNC: 12.7 MG/DL — SIGNIFICANT CHANGE UP (ref 8–20)
CALCIUM SERPL-MCNC: 8.5 MG/DL — SIGNIFICANT CHANGE UP (ref 8.4–10.5)
CHLORIDE SERPL-SCNC: 99 MMOL/L — SIGNIFICANT CHANGE UP (ref 96–108)
CO2 SERPL-SCNC: 25 MMOL/L — SIGNIFICANT CHANGE UP (ref 22–29)
CREAT SERPL-MCNC: 0.46 MG/DL — LOW (ref 0.5–1.3)
EGFR: 120 ML/MIN/1.73M2 — SIGNIFICANT CHANGE UP
GLUCOSE SERPL-MCNC: 88 MG/DL — SIGNIFICANT CHANGE UP (ref 70–99)
POTASSIUM SERPL-MCNC: 4 MMOL/L — SIGNIFICANT CHANGE UP (ref 3.5–5.3)
POTASSIUM SERPL-SCNC: 4 MMOL/L — SIGNIFICANT CHANGE UP (ref 3.5–5.3)
SODIUM SERPL-SCNC: 134 MMOL/L — LOW (ref 135–145)

## 2023-03-02 PROCEDURE — 99233 SBSQ HOSP IP/OBS HIGH 50: CPT

## 2023-03-02 PROCEDURE — 99232 SBSQ HOSP IP/OBS MODERATE 35: CPT

## 2023-03-02 RX ADMIN — SERTRALINE 50 MILLIGRAM(S): 25 TABLET, FILM COATED ORAL at 12:05

## 2023-03-02 RX ADMIN — ENOXAPARIN SODIUM 40 MILLIGRAM(S): 100 INJECTION SUBCUTANEOUS at 05:16

## 2023-03-02 RX ADMIN — TAMSULOSIN HYDROCHLORIDE 0.4 MILLIGRAM(S): 0.4 CAPSULE ORAL at 21:26

## 2023-03-02 NOTE — PROGRESS NOTE ADULT - SUBJECTIVE AND OBJECTIVE BOX
IOANA LITTLE    [id number]    62y      Male    Patient is a 62y old  Male who presents with a chief complaint of fall (02 Mar 2023 08:11)      INTERVAL HPI/OVERNIGHT EVENTS:    Patient is doing ok, pain is well managed with pain medications, he has talbot's cather as he was having urine retention, yesterday he was having nausea and vomiting, he occasionally gets it because of his Hx of MD, now resolved.     REVIEW OF SYSTEMS:    CONSTITUTIONAL: No fever, fatigue  RESPIRATORY: No cough, No shortness of breath  CARDIOVASCULAR: No chest pain, palpitations  GASTROINTESTINAL: No abdominal, No nausea, vomiting  NEUROLOGICAL: No headaches,  loss of strength.  MISCELLANEOUS: Left hip pain is well controlled         Vital Signs Last 24 Hrs  T(C): 36.8 (02 Mar 2023 10:01), Max: 36.9 (01 Mar 2023 16:36)  T(F): 98.3 (02 Mar 2023 10:01), Max: 98.5 (01 Mar 2023 21:54)  HR: 82 (02 Mar 2023 10:01) (64 - 82)  BP: 107/70 (02 Mar 2023 10:01) (107/70 - 127/78  RR: 18 (02 Mar 2023 10:01) (18 - 18)  SpO2: 94% (02 Mar 2023 10:01) (91% - 96%)    Parameters below as of 02 Mar 2023 10:01  Patient On (Oxygen Delivery Method): room air        PHYSICAL EXAM:    GENERAL: Middle age male looking comfortable   HEENT: PERRL, +EOMI  NECK: soft, Supple, No JVD   CHEST/LUNG: Clear to auscultate bilaterally; No wheezing  HEART: S1S2+, Regular rate and rhythm; No murmurs  ABDOMEN: Soft, Nontender, Nondistended; Bowel sounds present  EXTREMITIES:  1+ Peripheral Pulses, No edema, left hip with clean dressings on, no bleeding or soaking   SKIN: No rashes or lesions  NEURO: AAOX3  PSYCH: normal mood      LABS:    03-02    134<L>  |  99  |  12.7  ----------------------------<  88  4.0   |  25.0  |  0.46<L>    Ca    8.5      02 Mar 2023 05:07              I&O's Summary    01 Mar 2023 07:01  -  02 Mar 2023 07:00  --------------------------------------------------------  IN: 700 mL / OUT: 1550 mL / NET: -850 mL        MEDICATIONS  (STANDING):  enoxaparin Injectable 40 milliGRAM(s) SubCutaneous every 24 hours  glatiramer Injectable 40 milliGRAM(s) SubCutaneous <User Schedule>  lactated ringers. 1000 milliLiter(s) (100 mL/Hr) IV Continuous <Continuous>  sertraline 50 milliGRAM(s) Oral daily  tamsulosin 0.4 milliGRAM(s) Oral at bedtime    MEDICATIONS  (PRN):  acetaminophen     Tablet .. 650 milliGRAM(s) Oral every 6 hours PRN Temp greater or equal to 38C (100.4F)  HYDROmorphone   Tablet 4 milliGRAM(s) Oral every 4 hours PRN Severe Pain (7 - 10)  ondansetron Injectable 4 milliGRAM(s) IV Push every 6 hours PRN Nausea and/or Vomiting  oxyCODONE    IR 10 milliGRAM(s) Oral every 4 hours PRN Moderate Pain (4 - 6)  oxyCODONE    IR 5 milliGRAM(s) Oral every 4 hours PRN Mild Pain (1 - 3)

## 2023-03-02 NOTE — PROGRESS NOTE ADULT - SUBJECTIVE AND OBJECTIVE BOX
Patient feels well.  Reports pain in the hip is controlled, increases with movement/WB.     FUNCTIONAL PROGRESS  3/1 PT  Bed Mobility  Bed Mobility Training Sit-to-Supine: minimum assist (75% patient effort);  1 person assist  Bed Mobility Training Supine-to-Sit: minimum assist (75% patient effort);  1 person assist  Bed Mobility Training Limitations: decreased ability to use legs for bridging/pushing;  decreased ROM;  decreased flexibility;  decreased strength;  impaired balance    Sit-Stand Transfer Training  Transfer Training Sit-to-Stand Transfer: moderate assist (50% patient effort);  1 person assist;  verbal cues;  nonverbal cues (demo/gestures);  weight-bearing as tolerated   rolling walker  Transfer Training Stand-to-Sit Transfer: moderate assist (50% patient effort);  1 person assist;  nonverbal cues (demo/gestures);  verbal cues;  weight-bearing as tolerated   rolling walker  Sit-to-Stand Transfer Training Transfer Safety Analysis: impaired balance;  decreased strength;  decreased flexibility;  decreased ROM    Gait Training  Gait Trainin person assist;  nonverbal cues (demo/gestures);  verbal cues;  weight-bearing as tolerated   rolling walker;  5 feet;  moderate assist (50% patient effort)  Gait Analysis: impaired balance;  decreased strength;  impaired coordination;  decreased ROM;  pain;  5 feet;  rolling walker  Gait Number of Times:: x 1      VITALS  T(C): 36.8 (23 @ 04:30), Max: 36.9 (23 @ 10:00)  HR: 72 (23 @ 04:30) (64 - 85)  BP: 109/71 (23 @ 04:30) (102/67 - 127/78)  RR: 18 (23 @ 04:30) (18 - 18)  SpO2: 91% (23 @ 04:30) (91% - 96%)  Wt(kg): --    MEDICATIONS   acetaminophen     Tablet .. 650 milliGRAM(s) every 6 hours PRN  enoxaparin Injectable 40 milliGRAM(s) every 24 hours  glatiramer Injectable 40 milliGRAM(s) <User Schedule>  HYDROmorphone   Tablet 4 milliGRAM(s) every 4 hours PRN  lactated ringers. 1000 milliLiter(s) <Continuous>  ondansetron Injectable 4 milliGRAM(s) every 6 hours PRN  oxyCODONE    IR 10 milliGRAM(s) every 4 hours PRN  oxyCODONE    IR 5 milliGRAM(s) every 4 hours PRN  sertraline 50 milliGRAM(s) daily  tamsulosin 0.4 milliGRAM(s) at bedtime      RECENT LABS/IMAGING  - Reviewed        134<L>  |  99  |  12.7  ----------------------------<  88  4.0   |  25.0  |  0.46<L>    Ca    8.5      02 Mar 2023 05:07                LEFT HIP & LEFT FEMUR  - Left intertrochanteric hip fracture with angulated foreshortening   displacement.    CXR  - Unremarkable frontal chest x ray    TTE  -  1. Overall normal LV size, low normal systolic function with limited   imaging, est EF 50%. No focal wall motion abnormalities detected.   2. Left ventricular ejection fraction, by visual estimation, is 50%.   3. Normal global left ventricular systolic function.   4. Spectral Doppler shows impaired relaxation pattern of left   ventricular myocardial filling (Grade I diastolic dysfunction).   5. There is no evidence of pericardial effusion.   6. No evidence of mitral valve regurgitation.   7. Sclerotic aortic valve with normal opening.   8. Mild aortic regurgitation.   9. Mild pulmonic valve regurgitation.  10. Dilatation of the aortic root and ascending aorta.  11. Maximum aortic root dimension 4.0 cm.        ----------------------------------------------------------------------------------------  PHYSICAL EXAM  Constitutional - NAD, Comfortable  Extremities - Left LE swelling  Neurologic Exam -                    Cognitive - AAO to self, place, date, year, situation     FUNCTIONAL MOTOR EXAM -                     LEFT    UE - ShAB 5/5, EF 5/5, EE 5/5, WE 5/5,  5/5                    RIGHT UE - ShAB 5/5, EF 5/5, EE 5/5, WE 5/5,  5/5                    LEFT    LE - HF 1/5, KE 1/5, DF 5/5, PF 5/5                    RIGHT LE - HF 4/5, KE 4/5, DF 5/5, PF 5/5        Sensory - Intact to LT  Psychiatric - Mood stable, Affect WNL  ----------------------------------------------------------------------------------------  ASSESSMENT/PLAN  62yMale with functional deficits after a fall sustaining a hip fracture  Left IT fracture s/p IMN - WBAT  MS - Copaxone   Mood - Zoloft  Pain - Tylenol, Dilaudid, Oxycodone  DVT PPX - SCDs, Lovenox  Rehab - Patient is pending [facility] this AM. Based on patient's diagnosis, functional status, participation and potential for progress, continue to recommend ACUTE inpatient rehabilitation for the functional deficits consisting of 3 hours of therapy/day & 24 hour RN/daily PMR physician for comorbid medical management. Will continue to follow for ongoing rehab needs and recommendations. Patient will be able to tolerate 3 hours a day.    Continue bedside therapy as well as OOB throughout the day with mobilization throughout the day with staff to maintain cardiopulmonary function and prevention of secondary complications related to debility.      Will continue to follow. Rehab recommendations are dependent on how functional progress changes as well as how patient continues to participate and tolerate therapeutic interventions, which may change. Recommend ongoing mobilization by staff to maintain cardiopulmonary function and prevention of secondary complications related to debility. Discussed management with rehab clinical care team/rehab liaison.

## 2023-03-02 NOTE — PROGRESS NOTE ADULT - ASSESSMENT
61 y/o M with Hx of MS on Copaxone 3 times a week, he uses cane to help him walk, he tripped in his garage on concrete floor and started having pain in is left hip/leg, he called his neighbor for help and had an ambulance called, he denies any head injury or neck pain, has no chest pain, sob, medicine consulted for medical optimization for surgery, patient denies any Hx of exertional dyspnea or chest pain, his METS score is 2, RCRI is 0, patient has stable vitals, labs reviewed, patient is at intermediate risk for perioperative cardiovascular complications and is optimized from the medicine point of view for planned procedure, TTE ordered, will follow before proceeding for procedure.     Plan:     Left hip fracture due to mechanical fall s/p THR post op day 03:     - post op no complications  - VS stable  - abx per ortho   - opiate induced constipation regimen   - encouraging incentive spirometry   -c/w local wound care per ortho   -DVT prophylaxis and Pain meds as per Ortho team   -PT/OT and weight bearing per ortho    IS    Hx of MS: continue with his home medication., he occasionally gets nausea and vomiting, yesterday he had episode, no more episode, tolerating diet well.      Leucocytosis: Likely post op, no focus of infection, no fever, no chills.     BPH: On Flomax has urine retention s/p straight cath, unable to void, talbot's placed, he has urologist, he was told to follow with him to get voiding trial in his office.     Hypokalemia: Supplemented     Patient is stable from the medicine point of view for discharge pending PT and Ortho eval.     Medicine team is signing off, please call for any question.

## 2023-03-02 NOTE — PROGRESS NOTE ADULT - SUBJECTIVE AND OBJECTIVE BOX
IOANA LITTLE    [id number]    History: Patient is a 62 M status post left hip IMN - POD #3. Patient is doing well. The patient's pain is controlled using the prescribed pain medications. The patient is participating in physical therapy and pending placement in rehab. Denies nausea, vomiting, chest pain, shortness of breath, abdominal pain or fever. No new complaints.          03-02    134<L>  |  99  |  12.7  ----------------------------<  88  4.0   |  25.0  |  0.46<L>    Ca    8.5      02 Mar 2023 05:07        MEDICATIONS  (STANDING):  enoxaparin Injectable 40 milliGRAM(s) SubCutaneous every 24 hours  glatiramer Injectable 40 milliGRAM(s) SubCutaneous <User Schedule>  lactated ringers. 1000 milliLiter(s) (100 mL/Hr) IV Continuous <Continuous>  sertraline 50 milliGRAM(s) Oral daily  tamsulosin 0.4 milliGRAM(s) Oral at bedtime    MEDICATIONS  (PRN):  acetaminophen     Tablet .. 650 milliGRAM(s) Oral every 6 hours PRN Temp greater or equal to 38C (100.4F)  HYDROmorphone   Tablet 4 milliGRAM(s) Oral every 4 hours PRN Severe Pain (7 - 10)  ondansetron Injectable 4 milliGRAM(s) IV Push every 6 hours PRN Nausea and/or Vomiting  oxyCODONE    IR 10 milliGRAM(s) Oral every 4 hours PRN Moderate Pain (4 - 6)  oxyCODONE    IR 5 milliGRAM(s) Oral every 4 hours PRN Mild Pain (1 - 3)      Physical exam: The left hip dressing is clean, dry and intact. No drainage or discharge. No erythema is noted. No blistering. No ecchymosis. The calf is supple/nontender. Sensation to light touch is grossly intact distally. Motor function distally is grossly intact. No foot drop. 2+ dorsalis pedis pulse. Capillary refill is less than 2 seconds. No cyanosis.    Primary Orthopedic Assessment:  • s/p LEFT hip IMN.    Plan:   • DVT prophylaxis as prescribed, including use of compression devices and ankle pumps.  • Continue physical therapy.  • Weightbearing as tolerated of the right lower extremity with assistance of a walker.  • Incentive spirometry encouraged.  • Pain control as clinically indicated.  • Discharge planning – anticipated discharge is rehab.

## 2023-03-03 LAB — SARS-COV-2 RNA SPEC QL NAA+PROBE: SIGNIFICANT CHANGE UP

## 2023-03-03 RX ORDER — POLYETHYLENE GLYCOL 3350 17 G/17G
17 POWDER, FOR SOLUTION ORAL DAILY
Refills: 0 | Status: DISCONTINUED | OUTPATIENT
Start: 2023-03-03 | End: 2023-03-08

## 2023-03-03 RX ADMIN — SERTRALINE 50 MILLIGRAM(S): 25 TABLET, FILM COATED ORAL at 11:57

## 2023-03-03 RX ADMIN — TAMSULOSIN HYDROCHLORIDE 0.4 MILLIGRAM(S): 0.4 CAPSULE ORAL at 21:05

## 2023-03-03 RX ADMIN — GLATIRAMER ACETATE 40 MILLIGRAM(S): 20 INJECTION, SOLUTION SUBCUTANEOUS at 09:14

## 2023-03-03 RX ADMIN — SODIUM CHLORIDE 100 MILLILITER(S): 9 INJECTION, SOLUTION INTRAVENOUS at 05:36

## 2023-03-03 RX ADMIN — POLYETHYLENE GLYCOL 3350 17 GRAM(S): 17 POWDER, FOR SOLUTION ORAL at 11:57

## 2023-03-03 RX ADMIN — ENOXAPARIN SODIUM 40 MILLIGRAM(S): 100 INJECTION SUBCUTANEOUS at 05:35

## 2023-03-03 NOTE — PROGRESS NOTE ADULT - SUBJECTIVE AND OBJECTIVE BOX
ORTHOPEDIC POST-OP PROGRESS NOTE:    Name: IOANA LITTLE    MR #: [id number]    Procedure: Intramedulary nail of left him IMN  Surgeon: Ava SCOTT  DOS: 2/26/23    Pt comfortable without complaints, pain controlled. Denies CP, SOB, N/V, numbness/tingling.     Vital Signs Last 24 Hrs  T(C): 37 (03-03-23 @ 04:33), Max: 37 (03-03-23 @ 04:33)  T(F): 98.6 (03-03-23 @ 04:33), Max: 98.6 (03-03-23 @ 04:33)  HR: 92 (03-03-23 @ 04:33) (92 - 92)  BP: 121/77 (03-03-23 @ 04:33) (121/77 - 121/77)  BP(mean):  RR: 18 (03-03-23 @ 04:33) (18 - 18)  SpO2: 94% (03-03-23 @ 04:33) (94% - 94%)    General Exam:  General: Pt Alert and oriented, NAD, controlled pain  LLE:   Dressings C/D/I. No bleeding. No erythema.  visible Skin: No erythema. No wound dehiscence  Pulses: 2+ dorsalis pedis pulse. Cap refill < 2 sec.  Sensation: Grossly intact to light touch without deficit.  Motor: +EHL/FHL/AT/GC    A/P: 62y Male  POD# 4 s/p Left him IMN    - Ortho Stable  - Pain Control  - DVT ppx  - PT  - Weight bearing status: WBAT  - discharge to rehab

## 2023-03-04 RX ADMIN — TAMSULOSIN HYDROCHLORIDE 0.4 MILLIGRAM(S): 0.4 CAPSULE ORAL at 21:17

## 2023-03-04 RX ADMIN — ENOXAPARIN SODIUM 40 MILLIGRAM(S): 100 INJECTION SUBCUTANEOUS at 05:01

## 2023-03-04 RX ADMIN — POLYETHYLENE GLYCOL 3350 17 GRAM(S): 17 POWDER, FOR SOLUTION ORAL at 13:11

## 2023-03-04 RX ADMIN — SERTRALINE 50 MILLIGRAM(S): 25 TABLET, FILM COATED ORAL at 13:11

## 2023-03-04 NOTE — PROGRESS NOTE ADULT - SUBJECTIVE AND OBJECTIVE BOX
[facility]    [id number]    History: Patient is status post left hip IM nail on 2/27, POD#5. Patient is doing well. The patient's pain is controlled using the prescribed pain medications. The patient is participating in physical therapy WBAT LLE.  Denies nausea, vomiting, chest pain, shortness of breath, abdominal pain or fever. No new orthopedic  complaints.    MEDICATIONS  (STANDING):  enoxaparin Injectable 40 milliGRAM(s) SubCutaneous every 24 hours  glatiramer Injectable 40 milliGRAM(s) SubCutaneous <User Schedule>  lactated ringers. 1000 milliLiter(s) (100 mL/Hr) IV Continuous <Continuous>  polyethylene glycol 3350 17 Gram(s) Oral daily  sertraline 50 milliGRAM(s) Oral daily  tamsulosin 0.4 milliGRAM(s) Oral at bedtime    MEDICATIONS  (PRN):  acetaminophen     Tablet .. 650 milliGRAM(s) Oral every 6 hours PRN Temp greater or equal to 38C (100.4F)  HYDROmorphone   Tablet 4 milliGRAM(s) Oral every 4 hours PRN Severe Pain (7 - 10)  ondansetron Injectable 4 milliGRAM(s) IV Push every 6 hours PRN Nausea and/or Vomiting  oxyCODONE    IR 10 milliGRAM(s) Oral every 4 hours PRN Moderate Pain (4 - 6)  oxyCODONE    IR 5 milliGRAM(s) Oral every 4 hours PRN Mild Pain (1 - 3)      Physical exam: The left hip dressing remains clean, dry and intact. No drainage or discharge. No erythema is noted. No blistering. No ecchymosis. The calf is supple nontender. Passive range of motion is acceptable to due postoperative pain. No calf tenderness. Sensation to light touch is grossly intact distally. Motor function distally is 5/5. No foot drop. 2+ dorsalis pedis pulse. Capillary refill is less than 2 seconds. No cyanosis.    Primary Orthopedic Assessment:  • s/p LEFT hip IM nail    Secondary  Orthopedic Assessment(s):   •     Secondary  Medical Assessment(s):   •     Plan:   • DVT prophylaxis as prescribed, including use of compression devices and ankle pumps, Lovenox 40 mg daily  • Continue physical therapy  • Weightbearing as tolerated of the Left lower extremity with assistance of a walker  • Incentive spirometry encouraged  • Pain control as clinically indicated  • Discharge planning – anticipated discharge is acute rehabilitation

## 2023-03-04 NOTE — SPEECH LANGUAGE PATHOLOGY EVALUATION - SLP DIAGNOSIS
Receptive/expressive language functional for basic information. Trace dysarthria of speech, consistent with MS dx, however pt reports speech is at baseline. Overall intelligibility is good in both known and unknown contexts. At least mild-moderate cognitive deficits, specifically within domain of memory for both immediate and short term recall.  Pt feels these impairments are increased s/p his fall.

## 2023-03-04 NOTE — SPEECH LANGUAGE PATHOLOGY EVALUATION - SLP GENERAL OBSERVATIONS
Pt received A&A in bed, grossly 0x3 (unable to recall name of hospital, cited month as "May," however recalled year), pleasant and cooperative

## 2023-03-04 NOTE — SPEECH LANGUAGE PATHOLOGY EVALUATION - SLP PERTINENT HISTORY OF CURRENT PROBLEM
Pt reports h/o short term memory impairment since his dx with MS. Pt reports he received therapy in the past (unable to recall when), but notes that difficulties persist, and now seem increased s/p fall.

## 2023-03-04 NOTE — SPEECH LANGUAGE PATHOLOGY EVALUATION - COMMENTS
63 y/o M with Hx of MS on Copaxone 3 times a week, he uses cane to help him walk, he tripped in his garage on concrete floor and started having pain in is left hip/leg, he called his neighbor for help and had an ambulance called, he denies any head injury or neck pain, has no chest pain, sob,  Patient is status post left hip IM nail on 2/27, POD#5. Patient is doing well. Suspect at least mild-moderate deficits in memory domain, consistent with baseline status. Pt reports he feels memory deficits are increased s/p recent fall and hospitalization. Receptive language WFL for basic information, negatively impacted by cognitive deficits for longer, more complex information Trace dysarthria of speech; pt reports motor speech skills are at baseline

## 2023-03-04 NOTE — SPEECH LANGUAGE PATHOLOGY EVALUATION - SLP ORIENTATION
grossly x2 (initially stated "rehab," however corrected to "hospital" however unable to recall name. Stated month as "may," accurately recalled year)

## 2023-03-05 RX ADMIN — SODIUM CHLORIDE 100 MILLILITER(S): 9 INJECTION, SOLUTION INTRAVENOUS at 13:57

## 2023-03-05 RX ADMIN — ENOXAPARIN SODIUM 40 MILLIGRAM(S): 100 INJECTION SUBCUTANEOUS at 05:01

## 2023-03-05 RX ADMIN — POLYETHYLENE GLYCOL 3350 17 GRAM(S): 17 POWDER, FOR SOLUTION ORAL at 11:43

## 2023-03-05 RX ADMIN — TAMSULOSIN HYDROCHLORIDE 0.4 MILLIGRAM(S): 0.4 CAPSULE ORAL at 21:12

## 2023-03-05 RX ADMIN — SERTRALINE 50 MILLIGRAM(S): 25 TABLET, FILM COATED ORAL at 11:43

## 2023-03-05 NOTE — PROGRESS NOTE ADULT - SUBJECTIVE AND OBJECTIVE BOX
IOANA UGARTE    [id number]    History: Patient is status post left hip IM nail on 2/27, POD#6. Patient is doing well. The patient's pain is controlled using the prescribed pain medications. The patient is participating in physical therapy.  Denies nausea, vomiting, chest pain, shortness of breath, abdominal pain or fever. No new orthopedic  complaints. Awaiting rehab placement    MEDICATIONS  (STANDING):  enoxaparin Injectable 40 milliGRAM(s) SubCutaneous every 24 hours  glatiramer Injectable 40 milliGRAM(s) SubCutaneous <User Schedule>  lactated ringers. 1000 milliLiter(s) (100 mL/Hr) IV Continuous <Continuous>  polyethylene glycol 3350 17 Gram(s) Oral daily  sertraline 50 milliGRAM(s) Oral daily  tamsulosin 0.4 milliGRAM(s) Oral at bedtime    MEDICATIONS  (PRN):  acetaminophen     Tablet .. 650 milliGRAM(s) Oral every 6 hours PRN Temp greater or equal to 38C (100.4F)  HYDROmorphone   Tablet 4 milliGRAM(s) Oral every 4 hours PRN Severe Pain (7 - 10)  ondansetron Injectable 4 milliGRAM(s) IV Push every 6 hours PRN Nausea and/or Vomiting  oxyCODONE    IR 10 milliGRAM(s) Oral every 4 hours PRN Moderate Pain (4 - 6)  oxyCODONE    IR 5 milliGRAM(s) Oral every 4 hours PRN Mild Pain (1 - 3)    Physical exam: The left hip Mepilex dressing remains clean, dry and intact. No drainage or discharge. No erythema is noted. No blistering. No ecchymosis. The calf is supple nontender. Passive range of motion is acceptable to due postoperative pain. No calf tenderness. Sensation to light touch is grossly intact distally. Motor function distally is 5/5. No foot drop. 2+ dorsalis pedis pulse. Capillary refill is less than 2 seconds. No cyanosis.    Primary Orthopedic Assessment:  • s/p LEFT hip IM nail    Secondary  Medical Assessment(s):   • PMH: MS, post op Urinary retention    Plan:   • Maintain Heart  • DVT prophylaxis as prescribed, including use of compression devices and ankle pumps, Lovenox 40 mg daily  • Continue physical therapy  • Weightbearing as tolerated of the Left lower extremity with assistance of a walker  • Incentive spirometry encouraged  • Pain control as clinically indicated  • Medical care per Hospitalist  • Discharge planning – anticipated discharge is [facility]

## 2023-03-06 PROCEDURE — 99233 SBSQ HOSP IP/OBS HIGH 50: CPT

## 2023-03-06 RX ADMIN — TAMSULOSIN HYDROCHLORIDE 0.4 MILLIGRAM(S): 0.4 CAPSULE ORAL at 21:06

## 2023-03-06 RX ADMIN — SERTRALINE 50 MILLIGRAM(S): 25 TABLET, FILM COATED ORAL at 11:56

## 2023-03-06 RX ADMIN — ENOXAPARIN SODIUM 40 MILLIGRAM(S): 100 INJECTION SUBCUTANEOUS at 05:14

## 2023-03-06 RX ADMIN — GLATIRAMER ACETATE 40 MILLIGRAM(S): 20 INJECTION, SOLUTION SUBCUTANEOUS at 09:35

## 2023-03-06 RX ADMIN — POLYETHYLENE GLYCOL 3350 17 GRAM(S): 17 POWDER, FOR SOLUTION ORAL at 11:56

## 2023-03-06 NOTE — DIETITIAN INITIAL EVALUATION ADULT - PERTINENT MEDS FT
MEDICATIONS  (STANDING):  enoxaparin Injectable 40 milliGRAM(s) SubCutaneous every 24 hours  glatiramer Injectable 40 milliGRAM(s) SubCutaneous <User Schedule>  lactated ringers. 1000 milliLiter(s) (100 mL/Hr) IV Continuous <Continuous>  polyethylene glycol 3350 17 Gram(s) Oral daily  sertraline 50 milliGRAM(s) Oral daily  tamsulosin 0.4 milliGRAM(s) Oral at bedtime    MEDICATIONS  (PRN):  acetaminophen     Tablet .. 650 milliGRAM(s) Oral every 6 hours PRN Temp greater or equal to 38C (100.4F)  HYDROmorphone   Tablet 4 milliGRAM(s) Oral every 4 hours PRN Severe Pain (7 - 10)  ondansetron Injectable 4 milliGRAM(s) IV Push every 6 hours PRN Nausea and/or Vomiting  oxyCODONE    IR 10 milliGRAM(s) Oral every 4 hours PRN Moderate Pain (4 - 6)  oxyCODONE    IR 5 milliGRAM(s) Oral every 4 hours PRN Mild Pain (1 - 3)

## 2023-03-06 NOTE — DIETITIAN INITIAL EVALUATION ADULT - ORAL INTAKE PTA/DIET HISTORY
Pt found asleep, did not arouse to verbal stimuli - pt tolerating Reg diet with good PO intake per staff

## 2023-03-06 NOTE — PROGRESS NOTE ADULT - SUBJECTIVE AND OBJECTIVE BOX
Pt Name: IOANA LITTLE  MRN: [id number]      Patient is a 62y Male being followed for left hip IM nail, POD#7. Patient seen and examined at bedside. Patient is doing well. Pain is controlled with the prescribed medication. Denies CP, SOB, N/V, numbness/tingling. No other orthopedic complaints. Pending rehab placement      Vital Signs Last 24 Hrs  T(C): 36.9 (06 Mar 2023 04:41), Max: 36.9 (05 Mar 2023 16:58)  T(F): 98.5 (06 Mar 2023 04:41), Max: 98.5 (06 Mar 2023 04:41)  HR: 71 (06 Mar 2023 04:41) (71 - 82)  BP: 119/80 (06 Mar 2023 04:41) (106/69 - 119/80)  BP(mean): --  RR: 17 (06 Mar 2023 04:41) (17 - 19)  SpO2: 95% (06 Mar 2023 04:41) (93% - 95%)    Parameters below as of 06 Mar 2023 04:41  Patient On (Oxygen Delivery Method): room air      Daily     Daily         PHYSICAL EXAM:    Appearance: Alert, responsive, in no acute distress.    Musculoskeletal:       Left Lower Extremity: Dressing removed and new mepilex dressing placed. No abrasions, ecchymosis, or erythema. No bleeding. Sensation is grossly intact to light touch. + dorsi/plantarflexion/EHL/FHL. DP pulses 2+. Cap refill < 2 seconds. No cyanosis. No signs of venous insufficiency or stasis.           A/P:  Pt is a  62y Male s/p left hip IMN, POD#7    PLAN:   * Pain control  * DVTp - Lovenox  * Weight Bearing Status: WBAT LLE  * PT/OT  * maintain talbot for urinary retention as per medicine  * Dispo planning: Pending rehab placement

## 2023-03-06 NOTE — PROGRESS NOTE ADULT - SUBJECTIVE AND OBJECTIVE BOX
Patient feels well.  Pain in his right hip is improving.  Pending re-assessment of auth by insurance after P2P.    FUNCTIONAL PROGRESS  3/4 SLP  Clinical Impression and Recommendations:   · Diagnosis	Receptive/expressive language functional for basic information. Trace dysarthria of speech, consistent with MS dx, however pt reports speech is at baseline. Overall intelligibility is good in both known and unknown contexts. At least mild-moderate cognitive deficits, specifically within domain of memory for both immediate and short term recall.  Pt feels these impairments are increased s/p his fall.  · Patient/Family Goals Statement	"to get better"  · Criteria for Skilled Therapeutic Interventions Met	skilled criteria for intervention met    Behavioral Exam:   · Behavioral Observations	within functional limits; alert  · Orientation	grossly x2 (initially stated "rehab," however corrected to "hospital" however unable to recall name. Stated month as "may," accurately recalled year)    Auditory Comprehension:   · Able to Identify Objects	within functional limits  · Able to Identify Pictures	within functional limits  · Answers Yes/No Questions	within functional limits  · basic	100%  · complex	80%  · 1-step	100%  · 2-step	100%  · 3-step	100% with repetition  · Successful Auditory Strategies	repetition; elimination of environmental distractions  · Discourse	intact  · Right/Left Discrimination	intact  · Body Part ID	intact  · Open Ended Questions	intact  · Comments	Receptive language WFL for basic information, negatively impacted by cognitive deficits for longer, more complex information    Verbal Expression:   · Verbal Expression	intact  · Automatic Speech	intact; months of the year; days of the week; required priming cue  · Responsive Naming	intact  · Repetition	intact; word; phrase; sentence  · Fluency	impaired, likely - impacted by deficits in recall  · Conversational Speech	unremarkable syntax/semantic content    Cognitive Linguistic Status Examination:   · Attention	intact  · Short Term Memory	impaired  · Long Term Memory	impaired  · Problem Solving	intact  · Reasoning	intact  · Sequencing	intact  · Diffuse Language Characteristics	no  · Comments	Suspect at least mild-moderate deficits in memory domain, consistent with baseline status. Pt reports he feels memory deficits are increased s/p recent fall and hospitalization.    Motor Speech:   · Fluency	intact  · Alternating Rapid Sounds	impaired  · Observations	mild speech imprecision  · Prosody	intact  · Rate	intact  · Volume	WNL  · Comments	Trace dysarthria of speech; pt reports motor speech skills are at baseline    3/3 PT  Bed Mobility  Bed Mobility Training Sit-to-Supine: minimum assist (75% patient effort);  1 person assist  Bed Mobility Training Supine-to-Sit: minimum assist (75% patient effort);  1 person assist  Bed Mobility Training Limitations: decreased ability to use legs for bridging/pushing    Sit-Stand Transfer Training  Transfer Training Sit-to-Stand Transfer: minimum assist (75% patient effort);  1 person assist;  nonverbal cues (demo/gestures);  verbal cues;  weight-bearing as tolerated   rolling walker  Transfer Training Stand-to-Sit Transfer: minimum assist (75% patient effort);  1 person assist;  nonverbal cues (demo/gestures);  verbal cues;  weight-bearing as tolerated   rolling walker  Sit-to-Stand Transfer Training Transfer Safety Analysis: decreased flexibility;  decreased ROM;  decreased strength;  impaired balance;  impaired coordination;  impaired motor control;  pain;  impaired sensory feedback    Gait Training  Gait Training: minimum assist (75% patient effort);  1 person assist;  nonverbal cues (demo/gestures);  verbal cues;  weight-bearing as tolerated   rolling walker;  10 feet  Gait Analysis: increased time in double stance;  decreased hip/knee flexion;  decreased velocity of limb motion;  crouch;  decreased oj;  decreased ROM;  decreased flexibility;  decreased strength;  impaired coordination;  impaired balance;  impaired motor control;  impaired postural control;  10 feet;  rolling walker  Gait Number of Times:: x 1    3/3 OT  Bed Mobility  Bed Mobility Training Sit-to-Supine: moderate assist (50% patient effort);  1 person assist  Bed Mobility Training Supine-to-Sit: moderate assist (50% patient effort);  1 person assist  Bed Mobility Training Limitations: impaired postural control;  impaired motor control;  impaired coordination;  pain    Sit-Stand Transfer Training  Transfer Training Sit-to-Stand Transfer: moderate assist (50% patient effort);  1 person assist  Transfer Training Stand-to-Sit Transfer: moderate assist (50% patient effort);  1 person assist  Sit-to-Stand Transfer Training Transfer Safety Analysis: impaired postural control;  pain;  impaired motor control;  impaired balance;  impaired coordination    Toilet Transfer Training  Transfer Training Toilet Transfer: moderate assist (50% patient effort);  1 person assist  Toilet Transfer Training Transfer Safety Analysis: impaired motor control;  pain;  impaired balance;  decreased strength    Functional Endurance  Functional Endurance Detail: Pt ambulated with RW in room bed to commode with moderate assist. +tremors and posterior lean. Pt requires max verbal and physical cues for proper foot placement. Fair tolerance due to fatigue.     Upper Body Dressing Training  Upper Body Dressing Training Assistance: minimum assist (75% patient effort);  moderate assist (50% patient effort);  1 person assist;  to don gown seated at the edge of the bed;  cognitive, decreased safety awareness;  impaired motor control;  impaired coordination      VITALS  T(C): 36.9 (03-06-23 @ 04:41), Max: 36.9 (03-05-23 @ 16:58)  HR: 71 (03-06-23 @ 04:41) (71 - 76)  BP: 119/80 (03-06-23 @ 04:41) (108/73 - 119/80)  RR: 17 (03-06-23 @ 04:41) (17 - 19)  SpO2: 95% (03-06-23 @ 04:41) (93% - 95%)  Wt(kg): --    MEDICATIONS   acetaminophen     Tablet .. 650 milliGRAM(s) every 6 hours PRN  enoxaparin Injectable 40 milliGRAM(s) every 24 hours  glatiramer Injectable 40 milliGRAM(s) <User Schedule>  HYDROmorphone   Tablet 4 milliGRAM(s) every 4 hours PRN  lactated ringers. 1000 milliLiter(s) <Continuous>  ondansetron Injectable 4 milliGRAM(s) every 6 hours PRN  oxyCODONE    IR 10 milliGRAM(s) every 4 hours PRN  oxyCODONE    IR 5 milliGRAM(s) every 4 hours PRN  polyethylene glycol 3350 17 Gram(s) daily  sertraline 50 milliGRAM(s) daily  tamsulosin 0.4 milliGRAM(s) at bedtime      RECENT LABS/IMAGING  - Reviewed  03-02    134<L>  |  99  |  12.7  ----------------------------<  88  4.0   |  25.0  |  0.46<L>    Ca    8.5      02 Mar 2023 05:07      COVID 3/3 - negative                  LEFT HIP & LEFT FEMUR 2/26 - Left intertrochanteric hip fracture with angulated foreshortening   displacement.    CXR 2/26 - Unremarkable frontal chest x ray    TTE 2/27 -  1. Overall normal LV size, low normal systolic function with limited   imaging, est EF 50%. No focal wall motion abnormalities detected.   2. Left ventricular ejection fraction, by visual estimation, is 50%.   3. Normal global left ventricular systolic function.   4. Spectral Doppler shows impaired relaxation pattern of left   ventricular myocardial filling (Grade I diastolic dysfunction).   5. There is no evidence of pericardial effusion.   6. No evidence of mitral valve regurgitation.   7. Sclerotic aortic valve with normal opening.   8. Mild aortic regurgitation.   9. Mild pulmonic valve regurgitation.  10. Dilatation of the aortic root and ascending aorta.  11. Maximum aortic root dimension 4.0 cm.        ----------------------------------------------------------------------------------------  PHYSICAL EXAM  Constitutional - NAD, Comfortable  Extremities - Left LE swelling  Neurologic Exam -                    Cognitive - AAO to self, place, date, year, situation     FUNCTIONAL MOTOR EXAM -                     LEFT    UE - ShAB 5/5, EF 5/5, EE 5/5, WE 5/5,  5/5                    RIGHT UE - ShAB 5/5, EF 5/5, EE 5/5, WE 5/5,  5/5                    LEFT    LE - HF 1/5, KE 2/5, DF 5/5, PF 5/5                    RIGHT LE - HF 4/5, KE 4/5, DF 5/5, PF 5/5        Sensory - Intact to LT  Psychiatric - Mood stable, Affect WNL  ----------------------------------------------------------------------------------------  ASSESSMENT/PLAN  62yMale with functional deficits after a fall sustaining a hip fracture  Left IT fracture s/p IMN - WBAT  MS - Copaxone   Mood - Zoloft  Constipation - Miralax  Pain - Tylenol, Dilaudid, Oxycodone  DVT PPX - SCDs, Lovenox  Rehab - Patient pending reconsideration of AR by insurance after P2P and updated therapy notes.     Based on patient's diagnosis, functional status, participation and potential for progress, continue to recommend ACUTE inpatient rehabilitation for the functional deficits consisting of 3 hours of therapy/day & 24 hour RN/daily PMR physician for comorbid medical management. Will continue to follow for ongoing rehab needs and recommendations. Patient will be able to tolerate 3 hours a day.    Continue bedside therapy as well as OOB throughout the day with mobilization throughout the day with staff to maintain cardiopulmonary function and prevention of secondary complications related to debility.      Will continue to follow. Rehab recommendations are dependent on how functional progress changes as well as how patient continues to participate and tolerate therapeutic interventions, which may change. Recommend ongoing mobilization by staff to maintain cardiopulmonary function and prevention of secondary complications related to debility. Discussed management with rehab clinical care team/rehab liaison.

## 2023-03-07 LAB — SARS-COV-2 RNA SPEC QL NAA+PROBE: SIGNIFICANT CHANGE UP

## 2023-03-07 PROCEDURE — 99233 SBSQ HOSP IP/OBS HIGH 50: CPT | Mod: GC

## 2023-03-07 RX ADMIN — ENOXAPARIN SODIUM 40 MILLIGRAM(S): 100 INJECTION SUBCUTANEOUS at 05:35

## 2023-03-07 RX ADMIN — TAMSULOSIN HYDROCHLORIDE 0.4 MILLIGRAM(S): 0.4 CAPSULE ORAL at 22:37

## 2023-03-07 RX ADMIN — SERTRALINE 50 MILLIGRAM(S): 25 TABLET, FILM COATED ORAL at 12:35

## 2023-03-07 RX ADMIN — POLYETHYLENE GLYCOL 3350 17 GRAM(S): 17 POWDER, FOR SOLUTION ORAL at 12:35

## 2023-03-07 NOTE — PROGRESS NOTE ADULT - SUBJECTIVE AND OBJECTIVE BOX
Patient working with PT on standing.  Having a difficult time with pain and anxiety.     FUNCTIONAL PROGRESS  3/6 PT  Bed Mobility  Bed Mobility Training Sit-to-Supine: minimum assist (75% patient effort)  Bed Mobility Training Supine-to-Sit: minimum assist (75% patient effort);  1 person assist  Bed Mobility Training Limitations: decreased ROM;  decreased flexibility;  decreased strength;  impaired balance;  impaired motor control;  impaired coordination    Sit-Stand Transfer Training  Transfer Training Sit-to-Stand Transfer: weight-bearing as tolerated   rolling walker;  moderate assist (50% patient effort)  Transfer Training Stand-to-Sit Transfer: weight-bearing as tolerated   rolling walker;  moderate assist (50% patient effort)  Sit-to-Stand Transfer Training Transfer Safety Analysis: decreased ROM;  decreased flexibility;  impaired balance;  decreased strength;  impaired postural control;  impaired coordination;  impaired motor control    Gait Training  Gait Training: moderate assist (50% patient effort);  1 person assist;  nonverbal cues (demo/gestures);  verbal cues;  weight-bearing as tolerated   rolling walker;  5 feet  Gait Analysis: impaired postural control;  impaired motor control;  impaired coordination;  impaired balance;  decreased strength;  decreased ROM;  decreased flexibility;  5 feet;  rolling walker  Gait Number of Times:: x 2    3/4 SLP  Clinical Impression and Recommendations:   · Diagnosis	Receptive/expressive language functional for basic information. Trace dysarthria of speech, consistent with MS dx, however pt reports speech is at baseline. Overall intelligibility is good in both known and unknown contexts. At least mild-moderate cognitive deficits, specifically within domain of memory for both immediate and short term recall.  Pt feels these impairments are increased s/p his fall.  · Patient/Family Goals Statement	"to get better"  · Criteria for Skilled Therapeutic Interventions Met	skilled criteria for intervention met    Behavioral Exam:   · Behavioral Observations	within functional limits; alert  · Orientation	grossly x2 (initially stated "rehab," however corrected to "hospital" however unable to recall name. Stated month as "may," accurately recalled year)    Auditory Comprehension:   · Able to Identify Objects	within functional limits  · Able to Identify Pictures	within functional limits  · Answers Yes/No Questions	within functional limits  · basic	100%  · complex	80%  · 1-step	100%  · 2-step	100%  · 3-step	100% with repetition  · Successful Auditory Strategies	repetition; elimination of environmental distractions  · Discourse	intact  · Right/Left Discrimination	intact  · Body Part ID	intact  · Open Ended Questions	intact  · Comments	Receptive language WFL for basic information, negatively impacted by cognitive deficits for longer, more complex information    Verbal Expression:   · Verbal Expression	intact  · Automatic Speech	intact; months of the year; days of the week; required priming cue  · Responsive Naming	intact  · Repetition	intact; word; phrase; sentence  · Fluency	impaired, likely - impacted by deficits in recall  · Conversational Speech	unremarkable syntax/semantic content    Cognitive Linguistic Status Examination:   · Attention	intact  · Short Term Memory	impaired  · Long Term Memory	impaired  · Problem Solving	intact  · Reasoning	intact  · Sequencing	intact  · Diffuse Language Characteristics	no  · Comments	Suspect at least mild-moderate deficits in memory domain, consistent with baseline status. Pt reports he feels memory deficits are increased s/p recent fall and hospitalization.    Motor Speech:   · Fluency	intact  · Alternating Rapid Sounds	impaired  · Observations	mild speech imprecision  · Prosody	intact  · Rate	intact  · Volume	WNL  · Comments	Trace dysarthria of speech; pt reports motor speech skills are at baseline    VITALS  T(C): 36.7 (03-07-23 @ 04:14), Max: 37.3 (03-06-23 @ 20:59)  HR: 71 (03-07-23 @ 04:14) (71 - 83)  BP: 131/79 (03-07-23 @ 04:14) (100/63 - 131/79)  RR: 17 (03-07-23 @ 04:14) (17 - 18)  SpO2: 94% (03-07-23 @ 04:14) (92% - 94%)  Wt(kg): --    MEDICATIONS   acetaminophen     Tablet .. 650 milliGRAM(s) every 6 hours PRN  enoxaparin Injectable 40 milliGRAM(s) every 24 hours  glatiramer Injectable 40 milliGRAM(s) <User Schedule>  lactated ringers. 1000 milliLiter(s) <Continuous>  ondansetron Injectable 4 milliGRAM(s) every 6 hours PRN  polyethylene glycol 3350 17 Gram(s) daily  sertraline 50 milliGRAM(s) daily  tamsulosin 0.4 milliGRAM(s) at bedtime      RECENT LABS/IMAGING  - Reviewed                    03-02    134<L>  |  99  |  12.7  ----------------------------<  88  4.0   |  25.0  |  0.46<L>    Ca    8.5      02 Mar 2023 05:07      COVID 3/3 - negative                  LEFT HIP & LEFT FEMUR 2/26 - Left intertrochanteric hip fracture with angulated foreshortening   displacement.    CXR 2/26 - Unremarkable frontal chest x ray    TTE 2/27 -  1. Overall normal LV size, low normal systolic function with limited   imaging, est EF 50%. No focal wall motion abnormalities detected.   2. Left ventricular ejection fraction, by visual estimation, is 50%.   3. Normal global left ventricular systolic function.   4. Spectral Doppler shows impaired relaxation pattern of left   ventricular myocardial filling (Grade I diastolic dysfunction).   5. There is no evidence of pericardial effusion.   6. No evidence of mitral valve regurgitation.   7. Sclerotic aortic valve with normal opening.   8. Mild aortic regurgitation.   9. Mild pulmonic valve regurgitation.  10. Dilatation of the aortic root and ascending aorta.  11. Maximum aortic root dimension 4.0 cm.        ----------------------------------------------------------------------------------------  PHYSICAL EXAM  Constitutional - NAD, Comfortable  Extremities - Left LE swelling  Neurologic Exam -                    Cognitive - AAO to self, place, date, year, situation     FUNCTIONAL MOTOR EXAM -                     LEFT    UE - ShAB 5/5, EF 5/5, EE 5/5, WE 5/5,  5/5                    RIGHT UE - ShAB 5/5, EF 5/5, EE 5/5, WE 5/5,  5/5                    LEFT    LE - HF 2/5, KE 2/5, DF 5/5, PF 5/5                    RIGHT LE - HF 4/5, KE 4/5, DF 5/5, PF 5/5        Sensory - Intact to LT  Psychiatric - Mood anxious with standing  ----------------------------------------------------------------------------------------  ASSESSMENT/PLAN  62yMale with functional deficits after a fall sustaining a hip fracture  Left IT fracture s/p IMN - WBAT  MS - Copaxone   Mood - Zoloft  Constipation - Miralax  Pain - Tylenol, Dilaudid, Oxycodone  DVT PPX - SCDs, Lovenox  Rehab - Patient pending reconsideration of AR by insurance after P2P and updated therapy notes.     Based on patient's diagnosis, current ongoing functional status, history of MS, participation and potential for progress, continue to recommend ACUTE inpatient rehabilitation for the functional deficits consisting of 3 hours of therapy/day & 24 hour RN/daily PMR physician for comorbid medical management. Will continue to follow for ongoing rehab needs and recommendations. Patient will be able to tolerate 3 hours a day.    Continue bedside therapy as well as OOB throughout the day with mobilization throughout the day with staff to maintain cardiopulmonary function and prevention of secondary complications related to debility.      Will continue to follow. Rehab recommendations are dependent on how functional progress changes as well as how patient continues to participate and tolerate therapeutic interventions, which may change. Recommend ongoing mobilization by staff to maintain cardiopulmonary function and prevention of secondary complications related to debility. Discussed management with rehab clinical care team/rehab liaison.

## 2023-03-07 NOTE — PROGRESS NOTE ADULT - SUBJECTIVE AND OBJECTIVE BOX
Pt Name: IOANA LITTLE    MRN: [id number]      Patient is a being followed for s/p ORIF L hip POD8.  Patient seen and examined at bedside. Patient is doing well. Pain is controlled with the prescribed medication. Denies CP, SOB, N/V, numbness/tingling. No other orthopedic complaints. Pending rehab placement      PAST MEDICAL & SURGICAL HISTORY:  PAST MEDICAL & SURGICAL HISTORY:  Multiple sclerosis      No significant past surgical history          Allergies: erythromycin (Unknown)      Medications: acetaminophen     Tablet .. 650 milliGRAM(s) Oral every 6 hours PRN  enoxaparin Injectable 40 milliGRAM(s) SubCutaneous every 24 hours  glatiramer Injectable 40 milliGRAM(s) SubCutaneous <User Schedule>  lactated ringers. 1000 milliLiter(s) IV Continuous <Continuous>  ondansetron Injectable 4 milliGRAM(s) IV Push every 6 hours PRN  polyethylene glycol 3350 17 Gram(s) Oral daily  sertraline 50 milliGRAM(s) Oral daily  tamsulosin 0.4 milliGRAM(s) Oral at bedtime        Ambulation: Walking independently [ ] With Cane [ X] With Walker [ ]  Bedbound [ ]               PHYSICAL EXAM:    Vital Signs Last 24 Hrs  T(C): 36.7 (07 Mar 2023 04:14), Max: 37.3 (06 Mar 2023 20:59)  T(F): 98 (07 Mar 2023 04:14), Max: 99.2 (06 Mar 2023 20:59)  HR: 71 (07 Mar 2023 04:14) (71 - 83)  BP: 131/79 (07 Mar 2023 04:14) (100/63 - 131/79)  BP(mean): --  RR: 17 (07 Mar 2023 04:14) (17 - 18)  SpO2: 94% (07 Mar 2023 04:14) (92% - 94%)    Parameters below as of 07 Mar 2023 04:14  Patient On (Oxygen Delivery Method): room air      Daily     Daily     Appearance: Alert, responsive, in no acute distress.    Musculoskeletal:  LLE:  Dressing c/d/i, + distal pulses, calves soft/NT, FROM toes, + dorsiflex ankle, SILT, NVID    A/P:  Pt is a  62y Male s/p ORIF L hip POD8    PLAN:   Cont care  PT WBAT LLE  DVT ppx  Pain control  Encouraged pt to ambulate   LAbs appreciated  Awaiting auth for [facility] placement

## 2023-03-07 NOTE — PROGRESS NOTE ADULT - SUBJECTIVE AND OBJECTIVE BOX
Pt Name: IOANA LITTLE    MRN: [id number]    Patient is a being followed for left hip IMN POD#8. Patient reports pain is well controlled. Denies CP, SOB< N/V, fever/chills, motorsensory changes. Patient pending discharge to rehab facility.     PAST MEDICAL & SURGICAL HISTORY:  PAST MEDICAL & SURGICAL HISTORY:  Multiple sclerosis    No significant past surgical history    Allergies: erythromycin (Unknown)    Medications: acetaminophen     Tablet .. 650 milliGRAM(s) Oral every 6 hours PRN  enoxaparin Injectable 40 milliGRAM(s) SubCutaneous every 24 hours  glatiramer Injectable 40 milliGRAM(s) SubCutaneous <User Schedule>  lactated ringers. 1000 milliLiter(s) IV Continuous <Continuous>  ondansetron Injectable 4 milliGRAM(s) IV Push every 6 hours PRN  polyethylene glycol 3350 17 Gram(s) Oral daily  sertraline 50 milliGRAM(s) Oral daily  tamsulosin 0.4 milliGRAM(s) Oral at bedtime    PHYSICAL EXAM:    Vital Signs Last 24 Hrs  T(C): 36.7 (07 Mar 2023 04:14), Max: 37.3 (06 Mar 2023 20:59)  T(F): 98 (07 Mar 2023 04:14), Max: 99.2 (06 Mar 2023 20:59)  HR: 71 (07 Mar 2023 04:14) (71 - 83)  BP: 131/79 (07 Mar 2023 04:14) (100/63 - 131/79)  BP(mean): --  RR: 17 (07 Mar 2023 04:14) (17 - 18)  SpO2: 94% (07 Mar 2023 04:14) (92% - 94%)    Parameters below as of 07 Mar 2023 04:14  Patient On (Oxygen Delivery Method): room air      Daily     Daily     Appearance: Alert, responsive, in no acute distress.  Left Lower Extremity: Dressing removed and new mepilex dressing placed. No abrasions, ecchymosis, or erythema. No bleeding. Sensation is grossly intact to light touch. + dorsi/plantarflexion/EHL/FHL. DP pulses 2+. Cap refill < 2 seconds. No cyanosis. No signs of venous insufficiency or stasis.     A/P:  Pt is a  62y Male with left hip IMN POD#8    PLAN:   * Pain control  * DVTp - Lovenox  * Weight Bearing Status: WBAT LLE  * PT/OT  * maintain talbot for urinary retention as per medicine  * Dispo planning: Pending rehab placement

## 2023-03-08 ENCOUNTER — TRANSCRIPTION ENCOUNTER (OUTPATIENT)
Age: 63
End: 2023-03-08

## 2023-03-08 VITALS
RESPIRATION RATE: 18 BRPM | OXYGEN SATURATION: 96 % | DIASTOLIC BLOOD PRESSURE: 78 MMHG | TEMPERATURE: 98 F | SYSTOLIC BLOOD PRESSURE: 120 MMHG | HEART RATE: 65 BPM

## 2023-03-08 PROCEDURE — 96374 THER/PROPH/DIAG INJ IV PUSH: CPT

## 2023-03-08 PROCEDURE — 87640 STAPH A DNA AMP PROBE: CPT

## 2023-03-08 PROCEDURE — 99233 SBSQ HOSP IP/OBS HIGH 50: CPT

## 2023-03-08 PROCEDURE — 73552 X-RAY EXAM OF FEMUR 2/>: CPT

## 2023-03-08 PROCEDURE — 86803 HEPATITIS C AB TEST: CPT

## 2023-03-08 PROCEDURE — 85730 THROMBOPLASTIN TIME PARTIAL: CPT

## 2023-03-08 PROCEDURE — 71045 X-RAY EXAM CHEST 1 VIEW: CPT

## 2023-03-08 PROCEDURE — C1713: CPT

## 2023-03-08 PROCEDURE — 93005 ELECTROCARDIOGRAM TRACING: CPT

## 2023-03-08 PROCEDURE — 36415 COLL VENOUS BLD VENIPUNCTURE: CPT

## 2023-03-08 PROCEDURE — 85025 COMPLETE CBC W/AUTO DIFF WBC: CPT

## 2023-03-08 PROCEDURE — 85610 PROTHROMBIN TIME: CPT

## 2023-03-08 PROCEDURE — C8929: CPT

## 2023-03-08 PROCEDURE — 97163 PT EVAL HIGH COMPLEX 45 MIN: CPT

## 2023-03-08 PROCEDURE — C1769: CPT

## 2023-03-08 PROCEDURE — 80048 BASIC METABOLIC PNL TOTAL CA: CPT

## 2023-03-08 PROCEDURE — 99285 EMERGENCY DEPT VISIT HI MDM: CPT

## 2023-03-08 PROCEDURE — C9399: CPT

## 2023-03-08 PROCEDURE — 82962 GLUCOSE BLOOD TEST: CPT

## 2023-03-08 PROCEDURE — U0005: CPT

## 2023-03-08 PROCEDURE — 86900 BLOOD TYPING SEROLOGIC ABO: CPT

## 2023-03-08 PROCEDURE — 86901 BLOOD TYPING SEROLOGIC RH(D): CPT

## 2023-03-08 PROCEDURE — 80053 COMPREHEN METABOLIC PANEL: CPT

## 2023-03-08 PROCEDURE — 76000 FLUOROSCOPY <1 HR PHYS/QHP: CPT

## 2023-03-08 PROCEDURE — U0003: CPT

## 2023-03-08 PROCEDURE — 87641 MR-STAPH DNA AMP PROBE: CPT

## 2023-03-08 PROCEDURE — 73502 X-RAY EXAM HIP UNI 2-3 VIEWS: CPT

## 2023-03-08 PROCEDURE — 72190 X-RAY EXAM OF PELVIS: CPT

## 2023-03-08 PROCEDURE — 87637 SARSCOV2&INF A&B&RSV AMP PRB: CPT

## 2023-03-08 PROCEDURE — 86850 RBC ANTIBODY SCREEN: CPT

## 2023-03-08 RX ADMIN — GLATIRAMER ACETATE 40 MILLIGRAM(S): 20 INJECTION, SOLUTION SUBCUTANEOUS at 12:24

## 2023-03-08 RX ADMIN — ENOXAPARIN SODIUM 40 MILLIGRAM(S): 100 INJECTION SUBCUTANEOUS at 05:35

## 2023-03-08 RX ADMIN — SERTRALINE 50 MILLIGRAM(S): 25 TABLET, FILM COATED ORAL at 11:09

## 2023-03-08 NOTE — PROGRESS NOTE ADULT - PROVIDER SPECIALTY LIST ADULT
Hospitalist
Orthopedics
Rehab Medicine
Orthopedics
Rehab Medicine
Hospitalist
Orthopedics
Hospitalist

## 2023-03-08 NOTE — PROGRESS NOTE ADULT - SUBJECTIVE AND OBJECTIVE BOX
FUNCTIONAL PROGRESS  3/7 PT  Bed Mobility  Bed Mobility Training Sit-to-Supine: minimum assist (75% patient effort);  1 person assist;  nonverbal cues (demo/gestures);  verbal cues  Bed Mobility Training Supine-to-Sit: minimum assist (75% patient effort);  1 person assist;  nonverbal cues (demo/gestures);  verbal cues  Bed Mobility Training Limitations: decreased ROM;  decreased flexibility;  impaired balance;  decreased strength;  impaired coordination;  impaired motor control;  impaired postural control    Sit-Stand Transfer Training  Transfer Training Sit-to-Stand Transfer: 1 person assist;  nonverbal cues (demo/gestures);  verbal cues;  weight-bearing as tolerated   rolling walker;  moderate assist (50% patient effort)  Transfer Training Stand-to-Sit Transfer: moderate assist (50% patient effort);  1 person assist;  nonverbal cues (demo/gestures);  verbal cues;  weight-bearing as tolerated   rolling walker  Sit-to-Stand Transfer Training Transfer Safety Analysis: decreased ROM;  decreased flexibility;  decreased strength;  impaired coordination;  impaired motor control;  impaired balance;  impaired postural control    Gait Training  Gait Training: moderate assist (50% patient effort);  1 person assist;  nonverbal cues (demo/gestures);  verbal cues;  weight-bearing as tolerated   rolling walker;  5 feet  Gait Analysis: impaired coordination;  impaired balance;  decreased strength;  impaired postural control;  impaired motor control  Gait Number of Times:: x 1      VITALS  T(C): 36.8 (03-08-23 @ 09:58), Max: 37.1 (03-08-23 @ 04:36)  HR: 65 (03-08-23 @ 09:58) (63 - 73)  BP: 120/78 (03-08-23 @ 09:58) (115/74 - 133/86)  RR: 18 (03-08-23 @ 09:58) (18 - 18)  SpO2: 96% (03-08-23 @ 09:58) (96% - 97%)  Wt(kg): --    MEDICATIONS   acetaminophen     Tablet .. 650 milliGRAM(s) every 6 hours PRN  enoxaparin Injectable 40 milliGRAM(s) every 24 hours  glatiramer Injectable 40 milliGRAM(s) <User Schedule>  lactated ringers. 1000 milliLiter(s) <Continuous>  ondansetron Injectable 4 milliGRAM(s) every 6 hours PRN  polyethylene glycol 3350 17 Gram(s) daily  sertraline 50 milliGRAM(s) daily  tamsulosin 0.4 milliGRAM(s) at bedtime      RECENT LABS/IMAGING  - Reviewed    03-02    134<L>  |  99  |  12.7  ----------------------------<  88  4.0   |  25.0  |  0.46<L>    Ca    8.5      02 Mar 2023 05:07      COVID 3/3 - negative                  LEFT HIP & LEFT FEMUR 2/26 - Left intertrochanteric hip fracture with angulated foreshortening   displacement.    CXR 2/26 - Unremarkable frontal chest x ray    TTE 2/27 -  1. Overall normal LV size, low normal systolic function with limited   imaging, est EF 50%. No focal wall motion abnormalities detected.   2. Left ventricular ejection fraction, by visual estimation, is 50%.   3. Normal global left ventricular systolic function.   4. Spectral Doppler shows impaired relaxation pattern of left   ventricular myocardial filling (Grade I diastolic dysfunction).   5. There is no evidence of pericardial effusion.   6. No evidence of mitral valve regurgitation.   7. Sclerotic aortic valve with normal opening.   8. Mild aortic regurgitation.   9. Mild pulmonic valve regurgitation.  10. Dilatation of the aortic root and ascending aorta.  11. Maximum aortic root dimension 4.0 cm.        ----------------------------------------------------------------------------------------  PHYSICAL EXAM  Constitutional - NAD, Comfortable  Extremities - Left LE swelling  Neurologic Exam -                    Cognitive - AAO to self, place, date, year, situation     FUNCTIONAL MOTOR EXAM -                     LEFT    LE - HF 2/5, KE 2/5, DF 5/5, PF 5/5                    RIGHT LE - HF 4/5, KE 4/5, DF 5/5, PF 5/5        Sensory - Intact to LT  Psychiatric - Stable  ----------------------------------------------------------------------------------------  ASSESSMENT/PLAN  62yMale with functional deficits after a fall sustaining a hip fracture  Left IT fracture s/p IMN - WBAT  MS - Copaxone   Mood - Zoloft  Constipation - Miralax  Pain - Tylenol, Dilaudid, Oxycodone  DVT PPX - SCDs, Lovenox  Rehab - Patient pending reconsideration of AR by insurance after P2P and updated therapy notes.     Based on patient's diagnosis, current ongoing functional status, history of MS, participation and potential for progress, continue to recommend ACUTE inpatient rehabilitation for the functional deficits consisting of 3 hours of therapy/day & 24 hour RN/daily PMR physician for comorbid medical management. Will continue to follow for ongoing rehab needs and recommendations. Patient will be able to tolerate 3 hours a day.    Continue bedside therapy as well as OOB throughout the day with mobilization throughout the day with staff to maintain cardiopulmonary function and prevention of secondary complications related to debility.      Will continue to follow. Rehab recommendations are dependent on how functional progress changes as well as how patient continues to participate and tolerate therapeutic interventions, which may change. Recommend ongoing mobilization by staff to maintain cardiopulmonary function and prevention of secondary complications related to debility. Discussed management with rehab clinical care team/rehab liaison.

## 2023-03-08 NOTE — DISCHARGE NOTE NURSING/CASE MANAGEMENT/SOCIAL WORK - PATIENT PORTAL LINK FT
You can access the FollowMyHealth Patient Portal offered by [facility] by registering at the following website: [email or web address]. By joining [facility]’s FollowMyHealth portal, you will also be able to view your health information using other applications (apps) compatible with our system.

## 2023-03-08 NOTE — PROGRESS NOTE ADULT - SUBJECTIVE AND OBJECTIVE BOX
ORTHOPEDIC POST-OP PROGRESS NOTE:    Name: IOANA LITTLE    MR #: [id number]    Procedure: Intramedulary nail of left hip  Surgeon: Padma  DOS: 2/27/23      Pt comfortable without complaints, pain controlled. Denies CP, SOB, N/V, numbness/tingling           Vital Signs Last 24 Hrs  T(C): 37.1 (03-08-23 @ 04:36), Max: 37.1 (03-08-23 @ 04:36)  T(F): 98.7 (03-08-23 @ 04:36), Max: 98.7 (03-08-23 @ 04:36)  HR: 63 (03-08-23 @ 04:36) (63 - 63)  BP: 133/86 (03-08-23 @ 04:36) (133/86 - 133/86)  BP(mean): --  RR: 18 (03-08-23 @ 04:36) (18 - 18)  SpO2: 96% (03-08-23 @ 04:36) (96% - 96%)      General Exam:    General: Pt Alert and oriented, NAD, controlled pain.    Dressings: Mepilex dressing C/D/I. No bleeding. No erythema noted to visible skin.    Pulses: 2+ dorsalis pedis pulse. Cap refill < 2 sec.    Sensation: Grossly intact to light touch without deficit.    Motor: +EHL/FHL/AT/GC        A/P: 62y Male s/p left hip IMN POD #9    * Pain control  * DVTp - Lovenox  * Weight Bearing Status: WBAT LLE  * PT/OT  * maintain talbot for urinary retention as per medicine - patient has own urologist and informed to follow up for further management   * Dispo planning: Pending rehab placement

## 2023-04-20 PROBLEM — G35 MULTIPLE SCLEROSIS: Chronic | Status: ACTIVE | Noted: 2023-02-26

## 2023-04-27 ENCOUNTER — APPOINTMENT (OUTPATIENT)
Dept: ORTHOPEDIC SURGERY | Facility: CLINIC | Age: 63
End: 2023-04-27
Payer: MEDICARE

## 2023-04-27 ENCOUNTER — APPOINTMENT (OUTPATIENT)
Dept: ORTHOPEDIC SURGERY | Facility: CLINIC | Age: 63
End: 2023-04-27

## 2023-04-27 VITALS
DIASTOLIC BLOOD PRESSURE: 74 MMHG | TEMPERATURE: 97.9 F | SYSTOLIC BLOOD PRESSURE: 109 MMHG | HEART RATE: 80 BPM | HEIGHT: 67 IN | WEIGHT: 156 LBS | BODY MASS INDEX: 24.48 KG/M2

## 2023-04-27 PROCEDURE — 99024 POSTOP FOLLOW-UP VISIT: CPT

## 2023-04-27 PROCEDURE — 73502 X-RAY EXAM HIP UNI 2-3 VIEWS: CPT | Mod: 26,LT

## 2023-04-27 NOTE — HISTORY OF PRESENT ILLNESS
[Healed] : healed [Xray (Date:___)] : [unfilled] Xray -  [Doing Well] : is doing well [No Sign of Infection] : is showing no signs of infection [Adequate Pain Control] : has adequate pain control [Fever] : no fever [Erythema] : not erythematous [Discharge] : absent of discharge [Swelling] : not swollen [Dehiscence] : not dehisced [de-identified] : s/p intramedullary nail, left intertrochanteric hip fracture. Left hip arthrogram. 2/27/23 [de-identified] : 61 yo m s/p intramedullary nail, left intertrochanteric hip fracture. Left hip arthrogram. 2/27/23 here for pop visit.\par Patient is in [facility] rehab and receiving physical therapy.  He is able to stand and ambulate with a walker and PT.  He denies pain at this time. [de-identified] : sensation intact, no calf tenderness [de-identified] : xray left hip 2 views: hardware in place [de-identified] : Patient may continue PT WBAT, ROM, modalities.\par He can either retrun to office in 1 month for eval and xrays left hip OR he can have xrays done in rehab or home, have xrays sent to office for review, and set up telehealth visit.\par Patient agrees with plan.

## 2023-07-20 ENCOUNTER — APPOINTMENT (OUTPATIENT)
Dept: ORTHOPEDIC SURGERY | Facility: CLINIC | Age: 63
End: 2023-07-20

## 2023-07-27 ENCOUNTER — APPOINTMENT (OUTPATIENT)
Dept: ORTHOPEDIC SURGERY | Facility: CLINIC | Age: 63
End: 2023-07-27
Payer: MEDICARE

## 2023-07-27 VITALS — WEIGHT: 160 LBS | HEIGHT: 67 IN | BODY MASS INDEX: 25.11 KG/M2

## 2023-07-27 DIAGNOSIS — Z78.9 OTHER SPECIFIED HEALTH STATUS: ICD-10-CM

## 2023-07-27 PROCEDURE — 99203 OFFICE O/P NEW LOW 30 MIN: CPT

## 2023-07-27 PROCEDURE — 73564 X-RAY EXAM KNEE 4 OR MORE: CPT | Mod: 50

## 2023-07-27 PROCEDURE — 73503 X-RAY EXAM HIP UNI 4/> VIEWS: CPT | Mod: LT

## 2023-07-27 NOTE — PHYSICAL EXAM
[2+] : dorsalis pedis pulse: 2+ [Left] : left hip with pelvis [The fracture is in acceptable alignment. There is progression in healing seen] : The fracture is in acceptable alignment. There is progression in healing seen [Bilateral] : knee bilaterally [Mild tricompartmental OA medial narrowing] : Mild tricompartmental OA medial narrowing [] : no erythema

## 2023-07-27 NOTE — HISTORY OF PRESENT ILLNESS
[6] : 6 [Dull/Aching] : dull/aching [Localized] : localized [Sharp] : sharp [Household chores] : household chores [Sleep] : sleep [Rest] : rest [Standing] : standing [Walking] : walking [Stairs] : stairs [Disabled] : Work status: disabled [de-identified] : 62M p/w L hip and ludy knee pain. He fell back in [facility] and had a IMN for a hip fracture. He still complains of left hip pain and has trouble getting up the stairs. He has not done any PT since his surgery. He also complains of ludy knee pain. Pain is in left hip groin. [] : no [FreeTextEntry1] : left hip/ b/l knee  [FreeTextEntry5] : s/p intramedullary nail, left intertrochanteric hip fracture .  [de-identified] : [facility]  [de-identified] : months ago

## 2023-07-27 NOTE — ASSESSMENT
[FreeTextEntry1] : 62M p/w L hip fx s/p IMN and ludy knee OA\par \par Start PT\par continue NSAIDs for pain\par return 6-8 weeks\par \par The patient was advised of the diagnosis. The natural history of the pathology was explained in full to the patient in layman's terms. All questions were answered. The risks and benefits of surgical and non-surgical treatment alternatives were explained in full to the patient. \par  \par

## 2023-09-21 ENCOUNTER — APPOINTMENT (OUTPATIENT)
Dept: ORTHOPEDIC SURGERY | Facility: CLINIC | Age: 63
End: 2023-09-21
Payer: MEDICARE

## 2023-09-21 VITALS — WEIGHT: 160 LBS | BODY MASS INDEX: 25.11 KG/M2 | HEIGHT: 67 IN

## 2023-09-21 PROCEDURE — 99213 OFFICE O/P EST LOW 20 MIN: CPT

## 2023-09-21 PROCEDURE — 73503 X-RAY EXAM HIP UNI 4/> VIEWS: CPT | Mod: LT

## 2023-09-28 ENCOUNTER — APPOINTMENT (OUTPATIENT)
Dept: ORTHOPEDIC SURGERY | Facility: CLINIC | Age: 63
End: 2023-09-28
Payer: MEDICARE

## 2023-09-28 VITALS — WEIGHT: 160 LBS | BODY MASS INDEX: 25.11 KG/M2 | HEIGHT: 67 IN

## 2023-09-28 PROCEDURE — 20611 DRAIN/INJ JOINT/BURSA W/US: CPT | Mod: 50

## 2023-09-28 PROCEDURE — 99214 OFFICE O/P EST MOD 30 MIN: CPT | Mod: 25

## 2023-11-01 ENCOUNTER — APPOINTMENT (OUTPATIENT)
Dept: ORTHOPEDIC SURGERY | Facility: CLINIC | Age: 63
End: 2023-11-01
Payer: MEDICARE

## 2023-11-01 VITALS — HEIGHT: 67 IN | WEIGHT: 160 LBS | BODY MASS INDEX: 25.11 KG/M2

## 2023-11-01 PROCEDURE — 99214 OFFICE O/P EST MOD 30 MIN: CPT

## 2023-11-01 PROCEDURE — 71100 X-RAY EXAM RIBS UNI 2 VIEWS: CPT | Mod: LT

## 2023-11-01 PROCEDURE — 73502 X-RAY EXAM HIP UNI 2-3 VIEWS: CPT

## 2023-11-30 ENCOUNTER — APPOINTMENT (OUTPATIENT)
Dept: ORTHOPEDIC SURGERY | Facility: CLINIC | Age: 63
End: 2023-11-30
Payer: MEDICARE

## 2023-11-30 VITALS — HEIGHT: 67 IN | BODY MASS INDEX: 25.11 KG/M2 | WEIGHT: 160 LBS

## 2023-11-30 DIAGNOSIS — Z78.9 OTHER SPECIFIED HEALTH STATUS: ICD-10-CM

## 2023-11-30 PROCEDURE — 20610 DRAIN/INJ JOINT/BURSA W/O US: CPT | Mod: 50

## 2023-11-30 PROCEDURE — 99214 OFFICE O/P EST MOD 30 MIN: CPT | Mod: 25

## 2024-01-04 ENCOUNTER — APPOINTMENT (OUTPATIENT)
Dept: ORTHOPEDIC SURGERY | Facility: CLINIC | Age: 64
End: 2024-01-04
Payer: MEDICARE

## 2024-01-04 VITALS — WEIGHT: 160 LBS | HEIGHT: 67 IN | BODY MASS INDEX: 25.11 KG/M2

## 2024-01-04 DIAGNOSIS — M17.0 BILATERAL PRIMARY OSTEOARTHRITIS OF KNEE: ICD-10-CM

## 2024-01-04 PROCEDURE — 99213 OFFICE O/P EST LOW 20 MIN: CPT

## 2024-01-04 PROCEDURE — 73502 X-RAY EXAM HIP UNI 2-3 VIEWS: CPT

## 2024-01-04 NOTE — HISTORY OF PRESENT ILLNESS
[5] : 5 [Localized] : localized [Nothing helps with pain getting better] : Nothing helps with pain getting better [de-identified] : 62M p/w L hip and ludy knee pain. He fell back in [facility] and had a IMN for a hip fracture. He still complains of left hip pain and has trouble getting up the stairs. He has not done any PT since his surgery. He also complains of ludy knee pain. Pain is in left hip groin.  9/21/23: Here for f/u L  hip. s/p IMN around February. He is attending PT. His pain has improved, but he continues to have limitations.  9/28/23 f/u L hip and ludy knees, knee pain has worsened over the last week since he was last seen 1/4/24: Here for f/up L hip. States pain has been increasing lately due to the colder weather. He does his stationary bike 3-4x a week. [] : no [FreeTextEntry1] : left hip  [de-identified] : weather

## 2024-01-04 NOTE — ASSESSMENT
[FreeTextEntry1] : 63M p/w ludy knee OA, s/p L hip fx   Continue activities as tolerated Continue PT/HEP return 1 year or sooner if issues   The natural progression of Osteoarthritis was explained to the patient. We discussed the possible treatment options from conservative to operative. These included NSAIDS, Glucosamine and Chondrotin sulfate, and Physical Therapy as well different types of injections. We also discussed that at some point they may progress to needed a TKA. Information and pamphlets were given.

## 2024-02-20 ENCOUNTER — APPOINTMENT (OUTPATIENT)
Dept: PAIN MANAGEMENT | Facility: CLINIC | Age: 64
End: 2024-02-20
Payer: MEDICARE

## 2024-02-20 VITALS — HEIGHT: 67 IN | BODY MASS INDEX: 25.9 KG/M2 | WEIGHT: 165 LBS

## 2024-02-20 DIAGNOSIS — M70.62 TROCHANTERIC BURSITIS, LEFT HIP: ICD-10-CM

## 2024-02-20 DIAGNOSIS — M25.552 PAIN IN LEFT HIP: ICD-10-CM

## 2024-02-20 DIAGNOSIS — Z87.81 PERSONAL HISTORY OF (HEALED) TRAUMATIC FRACTURE: ICD-10-CM

## 2024-02-20 PROCEDURE — 99204 OFFICE O/P NEW MOD 45 MIN: CPT | Mod: 25

## 2024-02-20 PROCEDURE — 20610 DRAIN/INJ JOINT/BURSA W/O US: CPT | Mod: LT

## 2024-02-20 PROCEDURE — J3490M: CUSTOM

## 2024-02-20 RX ORDER — MELOXICAM 15 MG/1
15 TABLET ORAL
Qty: 90 | Refills: 1 | Status: ACTIVE | COMMUNITY
Start: 2024-02-20 | End: 1900-01-01

## 2024-02-20 NOTE — HISTORY OF PRESENT ILLNESS
[Sudden] : sudden [5] : 5 [Dull/Aching] : dull/aching [Intermittent] : intermittent [Nothing helps with pain getting better] : Nothing helps with pain getting better [] : no [FreeTextEntry1] : left hip

## 2024-02-20 NOTE — ASSESSMENT
[FreeTextEntry1] : A discussion regarding available pain management treatment options occurred with the patient.  These included interventional, rehabilitative, pharmacological, and alternative modalities. We will proceed with the following:    Interventional treatment options:   - Proceed with left GTB injection today - see additional instructions below    Rehabilitative options:   - initiate trial of aqua therapy  - participation in active HEP was discussed and encouraged as tolerated  Medication based treatment options:   - initiate trial of meloxicam 15 mg x 7-10 days then on PRN basis - see additional instructions below    Complementary treatment options:   - lifestyle modifications discussed   - Quad cane  Additional treatment recommendations as follows:   - patient will follow-up in 3 months or as needed basis  We have discussed the risks, benefits, and alternatives for NSAID therapy including but not limited to the risk of bleeding, thrombosis, gastric mucosal irritation/ulceration, allergic reaction and kidney dysfunction.  The patient verbalizes an understanding.  The risks, benefits and alternatives of the proposed procedure were explained in detail with the patient. The risks outlined include but are not limited to infection, bleeding, nerve injury, a temporary increase in pain, failure to resolve symptoms, need for future interventions, allergic reaction, and possible elevation of blood sugar in diabetics if using corticosteroid.  All questions were answered to patient's apparent satisfaction, and he/she verbalized an understanding.  The documentation recorded by the scribe, in my presence, accurately reflects the service I personally performed and the decisions made by me with my edits as appropriate.  I, Ej Foreman acting as scribe, attest that this documentation has been prepared under the direction and in the presence of Provider Tonio Newton DO.

## 2024-02-20 NOTE — PHYSICAL EXAM
[de-identified] : Constitutional:   - No acute distress   - Well developed; well nourished    Neurological:   - normal mood and affect   - alert and oriented x 3     Cardiovascular:   - grossly normal   Lumbar Spine Exam:   Inspection:  erythema (-)  ecchymosis (-)  rashes (-)  alignment: no scoliosis   Palpation:  Midline lumbar tenderness:            (-)  midline thoracic tenderness:          (-)  Lumbar paraspinal tenderness:  L (-) ; R (-)  thoracic paraspinal tenderness: L (-) ; R (-)  sciatic nerve tenderness :          L (-) ; R (-)  SI joint tenderness:                     L (-) ; R (-)  GTB tenderness:                        L (+);  R (-)   ROM:   Strength:                                     Right       Left     Hip Flexion:                (5/5)       (5/5)  Quadriceps:               (5/5)       (5/5)  Hamstrings:                (5/5)       (5/5)  Ankle Dorsiflexion:     (5/5)       (5/5)  EHL:                           (5/5)       (5/5)  Ankle Plantarflexion:  (5/5)       (5/5)   Special Tests:  SLR:                            R (-) ; L (-)  Facet loading:             R (-) ; L (-)  ANSON test:                R (-) ; L (-)  Hamstring tightness:   R (-);  L (-)   Neurologic:  SILT throughout right lower extremity  SILT throughout left lower extremity   Reflexes normal and symmetric bilateral lower extremities   Gait:  Severely antalgic gait  ambulates with quad cane and wheelchair

## 2024-02-20 NOTE — PROCEDURE
[Large Joint Injection] : Large joint injection [Left] : of the left [Greater Trochanteric Bursa] : greater trochanteric bursa [Pain] : pain [Inflammation] : inflammation [Alcohol] : alcohol [Ethyl Chloride sprayed topically] : ethyl chloride sprayed topically [Sterile technique used] : sterile technique used [___ cc    0.25%] : Bupivacaine (Marcaine) ~Vcc of 0.25%  [___ cc    40mg] : Triamcinolone (Kenalog) ~Vcc of 40 mg  [Call if redness, pain or fever occur] : call if redness, pain or fever occur [Apply ice for 15min out of every hour for the next 12-24 hours as tolerated] : apply ice for 15 minutes out of every hour for the next 12-24 hours as tolerated [Patient had decreased mobility in the joint] : patient had decreased mobility in the joint [Risks, benefits, alternatives discussed / Verbal consent obtained] : the risks benefits, and alternatives have been discussed, and verbal consent was obtained [] : Patient tolerated procedure well

## 2024-03-06 ENCOUNTER — APPOINTMENT (OUTPATIENT)
Dept: ORTHOPEDIC SURGERY | Facility: CLINIC | Age: 64
End: 2024-03-06
Payer: MEDICARE

## 2024-03-06 VITALS — WEIGHT: 165 LBS | HEIGHT: 67 IN | BODY MASS INDEX: 25.9 KG/M2

## 2024-03-06 DIAGNOSIS — R07.81 PLEURODYNIA: ICD-10-CM

## 2024-03-06 PROCEDURE — 71100 X-RAY EXAM RIBS UNI 2 VIEWS: CPT | Mod: LT

## 2024-03-06 PROCEDURE — 99213 OFFICE O/P EST LOW 20 MIN: CPT

## 2024-03-06 NOTE — IMAGING
[Left] : left rib [No bony abnormalities] : No bony abnormalities [FreeTextEntry5] : X-rays left ribs 3 views revealed no fractures or dislocations evident. [de-identified] : He is alert and oriented his vital signs are stable.  He presents using a wheelchair.  He does walk very slowly at this time with a shuffling type gait.   There is no evidence of open wounds infection or compartment syndrome.  There is no swelling or ecchymosis.  There is no erythema.  Examination left ribs reveals mild tenderness around the left anterior ribs. There is no deformity. No ecchymosis. He is breathing normally. Mild tenderness.  Full range of motion upper extremity

## 2024-03-06 NOTE — ASSESSMENT
[FreeTextEntry1] : Left rib pain no evidence for fracture.  Plan of recommending Tylenol at this point as needed  The left ribs there is no significant abnormality at this time I did advise he develops any shortness of breath at any time he must go to the emergency room as there is always a risk if there was a small fracture there previously that it could had a long period at this point and recommended conservative treatment.  I did advise him he continues to have rib symptoms he should see his primary medical doctor.  All questions were answered.   For the left hip and the knees he should follow-up with Dr. Brown. If he continues have problems with the ribs he should see his medical doctor.  This medical record was created utilizing Dragon voice recognition software.  This software is not perfect and may occasionally create typographical errors which may be reflected in the above medical record.

## 2024-03-06 NOTE — HISTORY OF PRESENT ILLNESS
[de-identified] : Patient is a 63-year-old male who sustained injury guards to his left hip and left ribs when he fell in February 2023.  He had an IM nail done at [facility] for the left hip fracture.  He never followed up with his doctor after that time.  He saw Dr. Brown in September 2023 and was evaluated for his hip and his knees.  He was advised that time to do physical therapy which she did not do.  He now presents is to continue plan of pain in the left hip but also some pain in his left ribs.  He denies any shortness of breath or prior treatment for the ribs.  March 6, 2024.  Patient presents for follow-up for his left ribs.  He is under the care of Dr. Brown for his knees and hips.

## 2024-04-25 NOTE — ED ADULT TRIAGE NOTE - CCCP TRG CHIEF CMPLNT
I did talk to the daughter.  Daughter's request regarding positive for K PSA score reading.  Patient is currently in [place] and is planning and scheduled to have prostate biopsy done in June per urology recommendations.  I did discuss in general the positive PSAs testing and I would strongly recommend that prostate biopsy be done and follow all recommendations of the urologist.  Daughter voices understanding of this.   arm pain/injury

## 2024-10-03 ENCOUNTER — APPOINTMENT (OUTPATIENT)
Dept: ORTHOPEDIC SURGERY | Facility: CLINIC | Age: 64
End: 2024-10-03
Payer: MEDICARE

## 2024-10-03 VITALS — WEIGHT: 165 LBS | HEIGHT: 67 IN | BODY MASS INDEX: 25.9 KG/M2

## 2024-10-03 DIAGNOSIS — Z78.9 OTHER SPECIFIED HEALTH STATUS: ICD-10-CM

## 2024-10-03 DIAGNOSIS — M25.552 PAIN IN LEFT HIP: ICD-10-CM

## 2024-10-03 PROCEDURE — 99213 OFFICE O/P EST LOW 20 MIN: CPT

## 2024-10-03 PROCEDURE — 73502 X-RAY EXAM HIP UNI 2-3 VIEWS: CPT

## 2024-10-03 NOTE — HISTORY OF PRESENT ILLNESS
[5] : 5 [Localized] : localized [Nothing helps with pain getting better] : Nothing helps with pain getting better [Walking] : walking [Stairs] : stairs [Household chores] : household chores [Leisure] : leisure [Social interactions] : social interactions [de-identified] : 62M p/w L hip and ludy knee pain. He fell back in [facility] and had a IMN for a hip fracture. He still complains of left hip pain and has trouble getting up the stairs. He has not done any PT since his surgery. He also complains of ludy knee pain. Pain is in left hip groin.  9/21/23: Here for f/u L  hip. s/p IMN around February. He is attending PT. His pain has improved, but he continues to have limitations.  9/28/23 f/u L hip and ludy knees, knee pain has worsened over the last week since he was last seen 1/4/24: Here for f/up L hip. States pain has been increasing lately due to the colder weather. He does his stationary bike 3-4x a week. 10/3/24:  f/u L hip.  Increased exacerbation of pain with ambulation, using cane with cold weather. Pain persists at fx site. Saw his neurologist, was improving with PT. He is unable to perform same exercises on his own.  MS is stable. [] : no [FreeTextEntry1] : left hip  [FreeTextEntry6] : getting irritated

## 2024-10-03 NOTE — REVIEW OF SYSTEMS
[Joint Pain] : joint pain [Negative] : Heme/Lymph [Muscle Weakness] : muscle weakness [Joint Stiffness] : no joint stiffness

## 2024-10-10 ENCOUNTER — APPOINTMENT (OUTPATIENT)
Dept: ORTHOPEDIC SURGERY | Facility: CLINIC | Age: 64
End: 2024-10-10
Payer: MEDICARE

## 2024-10-10 VITALS — WEIGHT: 165 LBS | HEIGHT: 67 IN | BODY MASS INDEX: 25.9 KG/M2

## 2024-10-10 DIAGNOSIS — M70.62 TROCHANTERIC BURSITIS, LEFT HIP: ICD-10-CM

## 2024-10-10 DIAGNOSIS — Z87.81 PERSONAL HISTORY OF (HEALED) TRAUMATIC FRACTURE: ICD-10-CM

## 2024-10-10 PROCEDURE — 99213 OFFICE O/P EST LOW 20 MIN: CPT

## 2024-10-31 ENCOUNTER — APPOINTMENT (OUTPATIENT)
Dept: ORTHOPEDIC SURGERY | Facility: CLINIC | Age: 64
End: 2024-10-31
Payer: MEDICARE

## 2024-10-31 VITALS — HEIGHT: 67 IN | WEIGHT: 165 LBS | BODY MASS INDEX: 25.9 KG/M2

## 2024-10-31 DIAGNOSIS — M25.552 PAIN IN LEFT HIP: ICD-10-CM

## 2024-10-31 DIAGNOSIS — M70.62 TROCHANTERIC BURSITIS, LEFT HIP: ICD-10-CM

## 2024-10-31 PROCEDURE — 99213 OFFICE O/P EST LOW 20 MIN: CPT

## 2024-11-26 ENCOUNTER — INPATIENT (INPATIENT)
Facility: HOSPITAL | Age: 64
LOS: 10 days | Discharge: NURSING, REHAB OR LONG-TERM CARE | DRG: 66 | End: 2024-12-07
Attending: PSYCHIATRY & NEUROLOGY | Admitting: HOSPITALIST
Payer: MEDICARE

## 2024-11-26 VITALS
SYSTOLIC BLOOD PRESSURE: 119 MMHG | HEART RATE: 81 BPM | WEIGHT: 175.05 LBS | DIASTOLIC BLOOD PRESSURE: 75 MMHG | HEIGHT: 67 IN | TEMPERATURE: 98 F | OXYGEN SATURATION: 98 % | RESPIRATION RATE: 16 BRPM

## 2024-11-26 LAB
APAP SERPL-MCNC: <3 UG/ML — LOW (ref 10–26)
BASOPHILS # BLD AUTO: 0.01 K/UL — SIGNIFICANT CHANGE UP (ref 0–0.2)
BASOPHILS NFR BLD AUTO: 0.1 % — SIGNIFICANT CHANGE UP (ref 0–2)
EOSINOPHIL # BLD AUTO: 0.18 K/UL — SIGNIFICANT CHANGE UP (ref 0–0.5)
EOSINOPHIL NFR BLD AUTO: 2 % — SIGNIFICANT CHANGE UP (ref 0–6)
ETHANOL SERPL-MCNC: <10 MG/DL — SIGNIFICANT CHANGE UP (ref 0–9)
HCT VFR BLD CALC: 40.4 % — SIGNIFICANT CHANGE UP (ref 39–50)
HGB BLD-MCNC: 13.7 G/DL — SIGNIFICANT CHANGE UP (ref 13–17)
IMM GRANULOCYTES NFR BLD AUTO: 0.3 % — SIGNIFICANT CHANGE UP (ref 0–0.9)
LYMPHOCYTES # BLD AUTO: 1.19 K/UL — SIGNIFICANT CHANGE UP (ref 1–3.3)
LYMPHOCYTES # BLD AUTO: 13 % — SIGNIFICANT CHANGE UP (ref 13–44)
MCHC RBC-ENTMCNC: 31.8 PG — SIGNIFICANT CHANGE UP (ref 27–34)
MCHC RBC-ENTMCNC: 33.9 G/DL — SIGNIFICANT CHANGE UP (ref 32–36)
MCV RBC AUTO: 93.7 FL — SIGNIFICANT CHANGE UP (ref 80–100)
MONOCYTES # BLD AUTO: 0.61 K/UL — SIGNIFICANT CHANGE UP (ref 0–0.9)
MONOCYTES NFR BLD AUTO: 6.7 % — SIGNIFICANT CHANGE UP (ref 2–14)
NEUTROPHILS # BLD AUTO: 7.15 K/UL — SIGNIFICANT CHANGE UP (ref 1.8–7.4)
NEUTROPHILS NFR BLD AUTO: 77.9 % — HIGH (ref 43–77)
PLATELET # BLD AUTO: 206 K/UL — SIGNIFICANT CHANGE UP (ref 150–400)
RBC # BLD: 4.31 M/UL — SIGNIFICANT CHANGE UP (ref 4.2–5.8)
RBC # FLD: 11.8 % — SIGNIFICANT CHANGE UP (ref 10.3–14.5)
SALICYLATES SERPL-MCNC: <0.6 MG/DL — LOW (ref 10–20)
TSH SERPL-MCNC: 1.27 UIU/ML — SIGNIFICANT CHANGE UP (ref 0.27–4.2)
WBC # BLD: 9.17 K/UL — SIGNIFICANT CHANGE UP (ref 3.8–10.5)
WBC # FLD AUTO: 9.17 K/UL — SIGNIFICANT CHANGE UP (ref 3.8–10.5)

## 2024-11-26 PROCEDURE — 99285 EMERGENCY DEPT VISIT HI MDM: CPT

## 2024-11-26 PROCEDURE — 93010 ELECTROCARDIOGRAM REPORT: CPT

## 2024-11-26 PROCEDURE — 71045 X-RAY EXAM CHEST 1 VIEW: CPT | Mod: 26

## 2024-11-26 RX ORDER — ACETAMINOPHEN 500 MG/5ML
975 LIQUID (ML) ORAL ONCE
Refills: 0 | Status: COMPLETED | OUTPATIENT
Start: 2024-11-26 | End: 2024-11-26

## 2024-11-27 ENCOUNTER — RESULT REVIEW (OUTPATIENT)
Age: 64
End: 2024-11-27

## 2024-11-27 DIAGNOSIS — Z98.890 OTHER SPECIFIED POSTPROCEDURAL STATES: Chronic | ICD-10-CM

## 2024-11-27 DIAGNOSIS — I63.9 CEREBRAL INFARCTION, UNSPECIFIED: ICD-10-CM

## 2024-11-27 LAB
AMPHET UR-MCNC: NEGATIVE — SIGNIFICANT CHANGE UP
APPEARANCE UR: CLEAR — SIGNIFICANT CHANGE UP
BACTERIA # UR AUTO: NEGATIVE /HPF — SIGNIFICANT CHANGE UP
BARBITURATES UR SCN-MCNC: NEGATIVE — SIGNIFICANT CHANGE UP
BENZODIAZ UR-MCNC: NEGATIVE — SIGNIFICANT CHANGE UP
BILIRUB UR-MCNC: NEGATIVE — SIGNIFICANT CHANGE UP
CAST: 0 /LPF — SIGNIFICANT CHANGE UP (ref 0–4)
COCAINE METAB.OTHER UR-MCNC: NEGATIVE — SIGNIFICANT CHANGE UP
COLOR SPEC: YELLOW — SIGNIFICANT CHANGE UP
DIFF PNL FLD: ABNORMAL
FENTANYL UR QL SCN: NEGATIVE — SIGNIFICANT CHANGE UP
FLUAV AG NPH QL: SIGNIFICANT CHANGE UP
FLUBV AG NPH QL: SIGNIFICANT CHANGE UP
GLUCOSE UR QL: NEGATIVE MG/DL — SIGNIFICANT CHANGE UP
KETONES UR-MCNC: NEGATIVE MG/DL — SIGNIFICANT CHANGE UP
LEUKOCYTE ESTERASE UR-ACNC: ABNORMAL
METHADONE UR-MCNC: NEGATIVE — SIGNIFICANT CHANGE UP
NITRITE UR-MCNC: NEGATIVE — SIGNIFICANT CHANGE UP
OPIATES UR-MCNC: NEGATIVE — SIGNIFICANT CHANGE UP
PCP SPEC-MCNC: SIGNIFICANT CHANGE UP
PCP UR-MCNC: NEGATIVE — SIGNIFICANT CHANGE UP
PH UR: 7.5 — SIGNIFICANT CHANGE UP (ref 5–8)
PROT UR-MCNC: NEGATIVE MG/DL — SIGNIFICANT CHANGE UP
RBC CASTS # UR COMP ASSIST: 10 /HPF — HIGH (ref 0–4)
RSV RNA NPH QL NAA+NON-PROBE: SIGNIFICANT CHANGE UP
SARS-COV-2 RNA SPEC QL NAA+PROBE: SIGNIFICANT CHANGE UP
SP GR SPEC: 1.02 — SIGNIFICANT CHANGE UP (ref 1–1.03)
SQUAMOUS # UR AUTO: 0 /HPF — SIGNIFICANT CHANGE UP (ref 0–5)
THC UR QL: NEGATIVE — SIGNIFICANT CHANGE UP
UROBILINOGEN FLD QL: 0.2 MG/DL — SIGNIFICANT CHANGE UP (ref 0.2–1)
WBC UR QL: 7 /HPF — HIGH (ref 0–5)

## 2024-11-27 PROCEDURE — 77075 RADEX OSSEOUS SURVEY COMPL: CPT | Mod: 26

## 2024-11-27 PROCEDURE — 70450 CT HEAD/BRAIN W/O DYE: CPT | Mod: 26,MC

## 2024-11-27 PROCEDURE — 99223 1ST HOSP IP/OBS HIGH 75: CPT

## 2024-11-27 PROCEDURE — 72125 CT NECK SPINE W/O DYE: CPT | Mod: 26,MC

## 2024-11-27 PROCEDURE — 93306 TTE W/DOPPLER COMPLETE: CPT | Mod: 26

## 2024-11-27 RX ORDER — GLATIRAMER ACETATE 40 MG/ML
40 INJECTION, SOLUTION SUBCUTANEOUS
Refills: 0 | Status: DISCONTINUED | OUTPATIENT
Start: 2024-11-27 | End: 2024-12-07

## 2024-11-27 RX ORDER — ATORVASTATIN CALCIUM 80 MG/1
80 TABLET, FILM COATED ORAL AT BEDTIME
Refills: 0 | Status: DISCONTINUED | OUTPATIENT
Start: 2024-11-27 | End: 2024-12-07

## 2024-11-27 RX ORDER — ENOXAPARIN SODIUM 100 MG/ML
40 INJECTION SUBCUTANEOUS EVERY 24 HOURS
Refills: 0 | Status: DISCONTINUED | OUTPATIENT
Start: 2024-11-27 | End: 2024-12-07

## 2024-11-27 RX ORDER — INFLUENZA A VIRUS A/IDAHO/07/2018 (H1N1) ANTIGEN (MDCK CELL DERIVED, PROPIOLACTONE INACTIVATED, INFLUENZA A VIRUS A/INDIANA/08/2018 (H3N2) ANTIGEN (MDCK CELL DERIVED, PROPIOLACTONE INACTIVATED), INFLUENZA B VIRUS B/SINGAPORE/INFTT-16-0610/2016 ANTIGEN (MDCK CELL DERIVED, PROPIOLACTONE INACTIVATED), INFLUENZA B VIRUS B/IOWA/06/2017 ANTIGEN (MDCK CELL DERIVED, PROPIOLACTONE INACTIVATED) 15; 15; 15; 15 UG/.5ML; UG/.5ML; UG/.5ML; UG/.5ML
0.5 INJECTION, SUSPENSION INTRAMUSCULAR ONCE
Refills: 0 | Status: DISCONTINUED | OUTPATIENT
Start: 2024-11-27 | End: 2024-12-07

## 2024-11-27 RX ORDER — CEFTRIAXONE 500 MG/1
1000 INJECTION, POWDER, FOR SOLUTION INTRAMUSCULAR; INTRAVENOUS EVERY 24 HOURS
Refills: 0 | Status: COMPLETED | OUTPATIENT
Start: 2024-11-27 | End: 2024-11-29

## 2024-11-27 RX ORDER — ASPIRIN 325 MG
81 TABLET ORAL DAILY
Refills: 0 | Status: DISCONTINUED | OUTPATIENT
Start: 2024-11-27 | End: 2024-12-07

## 2024-11-27 RX ORDER — ASPIRIN 325 MG
162 TABLET ORAL ONCE
Refills: 0 | Status: COMPLETED | OUTPATIENT
Start: 2024-11-27 | End: 2024-11-27

## 2024-11-27 RX ADMIN — GLATIRAMER ACETATE 40 MILLIGRAM(S): 40 INJECTION, SOLUTION SUBCUTANEOUS at 08:07

## 2024-11-27 RX ADMIN — ENOXAPARIN SODIUM 40 MILLIGRAM(S): 100 INJECTION SUBCUTANEOUS at 05:29

## 2024-11-27 RX ADMIN — Medication 81 MILLIGRAM(S): at 12:25

## 2024-11-27 RX ADMIN — CEFTRIAXONE 1000 MILLIGRAM(S): 500 INJECTION, POWDER, FOR SOLUTION INTRAMUSCULAR; INTRAVENOUS at 05:29

## 2024-11-27 RX ADMIN — ATORVASTATIN CALCIUM 80 MILLIGRAM(S): 80 TABLET, FILM COATED ORAL at 21:23

## 2024-11-27 RX ADMIN — Medication 975 MILLIGRAM(S): at 00:19

## 2024-11-27 RX ADMIN — Medication 162 MILLIGRAM(S): at 04:18

## 2024-11-27 RX ADMIN — Medication 1000 MILLILITER(S): at 00:20

## 2024-11-28 LAB
A1C WITH ESTIMATED AVERAGE GLUCOSE RESULT: 4.9 % — SIGNIFICANT CHANGE UP (ref 4–5.6)
ALBUMIN SERPL ELPH-MCNC: 3.7 G/DL — SIGNIFICANT CHANGE UP (ref 3.3–5.2)
ALP SERPL-CCNC: 65 U/L — SIGNIFICANT CHANGE UP (ref 40–120)
ALT FLD-CCNC: 12 U/L — SIGNIFICANT CHANGE UP
ANION GAP SERPL CALC-SCNC: 12 MMOL/L — SIGNIFICANT CHANGE UP (ref 5–17)
AST SERPL-CCNC: 17 U/L — SIGNIFICANT CHANGE UP
BILIRUB DIRECT SERPL-MCNC: 0.1 MG/DL — SIGNIFICANT CHANGE UP (ref 0–0.3)
BILIRUB INDIRECT FLD-MCNC: 0.4 MG/DL — SIGNIFICANT CHANGE UP (ref 0.2–1)
BILIRUB SERPL-MCNC: 0.5 MG/DL — SIGNIFICANT CHANGE UP (ref 0.4–2)
BUN SERPL-MCNC: 10.4 MG/DL — SIGNIFICANT CHANGE UP (ref 8–20)
CALCIUM SERPL-MCNC: 8.6 MG/DL — SIGNIFICANT CHANGE UP (ref 8.4–10.5)
CHLORIDE SERPL-SCNC: 103 MMOL/L — SIGNIFICANT CHANGE UP (ref 96–108)
CHOLEST SERPL-MCNC: 171 MG/DL — SIGNIFICANT CHANGE UP
CO2 SERPL-SCNC: 24 MMOL/L — SIGNIFICANT CHANGE UP (ref 22–29)
CREAT SERPL-MCNC: 0.43 MG/DL — LOW (ref 0.5–1.3)
CULTURE RESULTS: SIGNIFICANT CHANGE UP
EGFR: 121 ML/MIN/1.73M2 — SIGNIFICANT CHANGE UP
EGFR: 121 ML/MIN/1.73M2 — SIGNIFICANT CHANGE UP
ESTIMATED AVERAGE GLUCOSE: 94 MG/DL — SIGNIFICANT CHANGE UP (ref 68–114)
GLUCOSE SERPL-MCNC: 89 MG/DL — SIGNIFICANT CHANGE UP (ref 70–99)
HCT VFR BLD CALC: 39 % — SIGNIFICANT CHANGE UP (ref 39–50)
HDLC SERPL-MCNC: 56 MG/DL — SIGNIFICANT CHANGE UP
HGB BLD-MCNC: 12.9 G/DL — LOW (ref 13–17)
LDLC SERPL-MCNC: 104 MG/DL — HIGH
LIPID PNL WITH DIRECT LDL SERPL: 104 MG/DL — HIGH
MAGNESIUM SERPL-MCNC: 1.9 MG/DL — SIGNIFICANT CHANGE UP (ref 1.8–2.6)
MCHC RBC-ENTMCNC: 31.2 PG — SIGNIFICANT CHANGE UP (ref 27–34)
MCHC RBC-ENTMCNC: 33.1 G/DL — SIGNIFICANT CHANGE UP (ref 32–36)
MCV RBC AUTO: 94.4 FL — SIGNIFICANT CHANGE UP (ref 80–100)
NONHDLC SERPL-MCNC: 115 MG/DL — SIGNIFICANT CHANGE UP
PHOSPHATE SERPL-MCNC: 2.3 MG/DL — LOW (ref 2.4–4.7)
PLATELET # BLD AUTO: 197 K/UL — SIGNIFICANT CHANGE UP (ref 150–400)
POTASSIUM SERPL-MCNC: 4 MMOL/L — SIGNIFICANT CHANGE UP (ref 3.5–5.3)
POTASSIUM SERPL-SCNC: 4 MMOL/L — SIGNIFICANT CHANGE UP (ref 3.5–5.3)
PROT SERPL-MCNC: 5.9 G/DL — LOW (ref 6.6–8.7)
RBC # BLD: 4.13 M/UL — LOW (ref 4.2–5.8)
RBC # FLD: 11.9 % — SIGNIFICANT CHANGE UP (ref 10.3–14.5)
SODIUM SERPL-SCNC: 139 MMOL/L — SIGNIFICANT CHANGE UP (ref 135–145)
SPECIMEN SOURCE: SIGNIFICANT CHANGE UP
TRIGL SERPL-MCNC: 53 MG/DL — SIGNIFICANT CHANGE UP
WBC # BLD: 14.77 K/UL — HIGH (ref 3.8–10.5)
WBC # FLD AUTO: 14.77 K/UL — HIGH (ref 3.8–10.5)

## 2024-11-28 PROCEDURE — 93970 EXTREMITY STUDY: CPT | Mod: 26

## 2024-11-28 RX ORDER — ACETAMINOPHEN 500 MG/5ML
1000 LIQUID (ML) ORAL ONCE
Refills: 0 | Status: DISCONTINUED | OUTPATIENT
Start: 2024-11-28 | End: 2024-12-07

## 2024-11-28 RX ORDER — LANOLIN/MINERAL OIL/PETROLATUM
1 OINTMENT (GRAM) OPHTHALMIC (EYE) THREE TIMES A DAY
Refills: 0 | Status: DISCONTINUED | OUTPATIENT
Start: 2024-11-28 | End: 2024-12-07

## 2024-11-28 RX ORDER — TAMSULOSIN HYDROCHLORIDE 0.4 MG/1
0.4 CAPSULE ORAL AT BEDTIME
Refills: 0 | Status: DISCONTINUED | OUTPATIENT
Start: 2024-11-28 | End: 2024-11-29

## 2024-11-28 RX ORDER — ONDANSETRON HCL/PF 4 MG/2 ML
4 VIAL (ML) INJECTION ONCE
Refills: 0 | Status: DISCONTINUED | OUTPATIENT
Start: 2024-11-28 | End: 2024-12-07

## 2024-11-28 RX ORDER — ACETAMINOPHEN 500 MG/5ML
650 LIQUID (ML) ORAL EVERY 6 HOURS
Refills: 0 | Status: DISCONTINUED | OUTPATIENT
Start: 2024-11-28 | End: 2024-12-07

## 2024-11-28 RX ADMIN — Medication 81 MILLIGRAM(S): at 12:42

## 2024-11-28 RX ADMIN — ENOXAPARIN SODIUM 40 MILLIGRAM(S): 100 INJECTION SUBCUTANEOUS at 04:39

## 2024-11-28 RX ADMIN — Medication 1000 MILLILITER(S): at 01:13

## 2024-11-28 RX ADMIN — Medication 1000 MILLILITER(S): at 02:44

## 2024-11-28 RX ADMIN — Medication 75 MILLILITER(S): at 03:54

## 2024-11-28 RX ADMIN — CEFTRIAXONE 1000 MILLIGRAM(S): 500 INJECTION, POWDER, FOR SOLUTION INTRAMUSCULAR; INTRAVENOUS at 04:39

## 2024-11-28 RX ADMIN — ATORVASTATIN CALCIUM 80 MILLIGRAM(S): 80 TABLET, FILM COATED ORAL at 21:54

## 2024-11-28 RX ADMIN — TAMSULOSIN HYDROCHLORIDE 0.4 MILLIGRAM(S): 0.4 CAPSULE ORAL at 21:54

## 2024-11-28 RX ADMIN — Medication 1 DROP(S): at 01:14

## 2024-11-29 ENCOUNTER — TRANSCRIPTION ENCOUNTER (OUTPATIENT)
Age: 64
End: 2024-11-29

## 2024-11-29 LAB
ALBUMIN SERPL ELPH-MCNC: 3.4 G/DL — SIGNIFICANT CHANGE UP (ref 3.3–5.2)
ALP SERPL-CCNC: 60 U/L — SIGNIFICANT CHANGE UP (ref 40–120)
ALT FLD-CCNC: 11 U/L — SIGNIFICANT CHANGE UP
ANION GAP SERPL CALC-SCNC: 10 MMOL/L — SIGNIFICANT CHANGE UP (ref 5–17)
AST SERPL-CCNC: 16 U/L — SIGNIFICANT CHANGE UP
BILIRUB SERPL-MCNC: 0.6 MG/DL — SIGNIFICANT CHANGE UP (ref 0.4–2)
BUN SERPL-MCNC: 9.8 MG/DL — SIGNIFICANT CHANGE UP (ref 8–20)
CALCIUM SERPL-MCNC: 8.5 MG/DL — SIGNIFICANT CHANGE UP (ref 8.4–10.5)
CHLORIDE SERPL-SCNC: 108 MMOL/L — SIGNIFICANT CHANGE UP (ref 96–108)
CO2 SERPL-SCNC: 25 MMOL/L — SIGNIFICANT CHANGE UP (ref 22–29)
CREAT SERPL-MCNC: 0.5 MG/DL — SIGNIFICANT CHANGE UP (ref 0.5–1.3)
EGFR: 115 ML/MIN/1.73M2 — SIGNIFICANT CHANGE UP
EGFR: 115 ML/MIN/1.73M2 — SIGNIFICANT CHANGE UP
GLUCOSE SERPL-MCNC: 105 MG/DL — HIGH (ref 70–99)
HCT VFR BLD CALC: 37.4 % — LOW (ref 39–50)
HGB BLD-MCNC: 12.5 G/DL — LOW (ref 13–17)
MAGNESIUM SERPL-MCNC: 2.1 MG/DL — SIGNIFICANT CHANGE UP (ref 1.6–2.6)
MCHC RBC-ENTMCNC: 31.5 PG — SIGNIFICANT CHANGE UP (ref 27–34)
MCHC RBC-ENTMCNC: 33.4 G/DL — SIGNIFICANT CHANGE UP (ref 32–36)
MCV RBC AUTO: 94.2 FL — SIGNIFICANT CHANGE UP (ref 80–100)
PHOSPHATE SERPL-MCNC: 3.3 MG/DL — SIGNIFICANT CHANGE UP (ref 2.4–4.7)
PLATELET # BLD AUTO: 190 K/UL — SIGNIFICANT CHANGE UP (ref 150–400)
POTASSIUM SERPL-MCNC: 3.7 MMOL/L — SIGNIFICANT CHANGE UP (ref 3.5–5.3)
POTASSIUM SERPL-SCNC: 3.7 MMOL/L — SIGNIFICANT CHANGE UP (ref 3.5–5.3)
PROT SERPL-MCNC: 5.6 G/DL — LOW (ref 6.6–8.7)
RBC # BLD: 3.97 M/UL — LOW (ref 4.2–5.8)
RBC # FLD: 12 % — SIGNIFICANT CHANGE UP (ref 10.3–14.5)
SODIUM SERPL-SCNC: 143 MMOL/L — SIGNIFICANT CHANGE UP (ref 135–145)
WBC # BLD: 10.95 K/UL — HIGH (ref 3.8–10.5)
WBC # FLD AUTO: 10.95 K/UL — HIGH (ref 3.8–10.5)

## 2024-11-29 PROCEDURE — 99232 SBSQ HOSP IP/OBS MODERATE 35: CPT

## 2024-11-29 RX ORDER — TAMSULOSIN HYDROCHLORIDE 0.4 MG/1
0.8 CAPSULE ORAL AT BEDTIME
Refills: 0 | Status: DISCONTINUED | OUTPATIENT
Start: 2024-11-29 | End: 2024-12-07

## 2024-11-29 RX ORDER — OXYCODONE HYDROCHLORIDE AND ACETAMINOPHEN 10; 325 MG/1; MG/1
1 TABLET ORAL ONCE
Refills: 0 | Status: DISCONTINUED | OUTPATIENT
Start: 2024-11-29 | End: 2024-11-29

## 2024-11-29 RX ADMIN — GLATIRAMER ACETATE 40 MILLIGRAM(S): 40 INJECTION, SOLUTION SUBCUTANEOUS at 11:16

## 2024-11-29 RX ADMIN — Medication 75 MILLILITER(S): at 09:20

## 2024-11-29 RX ADMIN — ATORVASTATIN CALCIUM 80 MILLIGRAM(S): 80 TABLET, FILM COATED ORAL at 21:26

## 2024-11-29 RX ADMIN — Medication 75 MILLILITER(S): at 05:37

## 2024-11-29 RX ADMIN — Medication 500 MILLILITER(S): at 09:21

## 2024-11-29 RX ADMIN — CEFTRIAXONE 1000 MILLIGRAM(S): 500 INJECTION, POWDER, FOR SOLUTION INTRAMUSCULAR; INTRAVENOUS at 05:37

## 2024-11-29 RX ADMIN — Medication 81 MILLIGRAM(S): at 11:16

## 2024-11-29 RX ADMIN — TAMSULOSIN HYDROCHLORIDE 0.8 MILLIGRAM(S): 0.4 CAPSULE ORAL at 21:26

## 2024-11-29 RX ADMIN — ENOXAPARIN SODIUM 40 MILLIGRAM(S): 100 INJECTION SUBCUTANEOUS at 05:36

## 2024-11-30 LAB
ALBUMIN SERPL ELPH-MCNC: 3.5 G/DL — SIGNIFICANT CHANGE UP (ref 3.3–5.2)
ALP SERPL-CCNC: 61 U/L — SIGNIFICANT CHANGE UP (ref 40–120)
ALT FLD-CCNC: 11 U/L — SIGNIFICANT CHANGE UP
ANION GAP SERPL CALC-SCNC: 10 MMOL/L — SIGNIFICANT CHANGE UP (ref 5–17)
AST SERPL-CCNC: 18 U/L — SIGNIFICANT CHANGE UP
BILIRUB SERPL-MCNC: 0.5 MG/DL — SIGNIFICANT CHANGE UP (ref 0.4–2)
BUN SERPL-MCNC: 10.4 MG/DL — SIGNIFICANT CHANGE UP (ref 8–20)
CALCIUM SERPL-MCNC: 9 MG/DL — SIGNIFICANT CHANGE UP (ref 8.4–10.5)
CHLORIDE SERPL-SCNC: 102 MMOL/L — SIGNIFICANT CHANGE UP (ref 96–108)
CO2 SERPL-SCNC: 26 MMOL/L — SIGNIFICANT CHANGE UP (ref 22–29)
CREAT SERPL-MCNC: 0.42 MG/DL — LOW (ref 0.5–1.3)
EGFR: 122 ML/MIN/1.73M2 — SIGNIFICANT CHANGE UP
EGFR: 122 ML/MIN/1.73M2 — SIGNIFICANT CHANGE UP
GLUCOSE SERPL-MCNC: 91 MG/DL — SIGNIFICANT CHANGE UP (ref 70–99)
HCT VFR BLD CALC: 37.8 % — LOW (ref 39–50)
HGB BLD-MCNC: 12.7 G/DL — LOW (ref 13–17)
MAGNESIUM SERPL-MCNC: 1.8 MG/DL — SIGNIFICANT CHANGE UP (ref 1.6–2.6)
MCHC RBC-ENTMCNC: 31.6 PG — SIGNIFICANT CHANGE UP (ref 27–34)
MCHC RBC-ENTMCNC: 33.6 G/DL — SIGNIFICANT CHANGE UP (ref 32–36)
MCV RBC AUTO: 94 FL — SIGNIFICANT CHANGE UP (ref 80–100)
PHOSPHATE SERPL-MCNC: 2.9 MG/DL — SIGNIFICANT CHANGE UP (ref 2.4–4.7)
PLATELET # BLD AUTO: 193 K/UL — SIGNIFICANT CHANGE UP (ref 150–400)
POTASSIUM SERPL-MCNC: 3.7 MMOL/L — SIGNIFICANT CHANGE UP (ref 3.5–5.3)
POTASSIUM SERPL-SCNC: 3.7 MMOL/L — SIGNIFICANT CHANGE UP (ref 3.5–5.3)
PROT SERPL-MCNC: 5.6 G/DL — LOW (ref 6.6–8.7)
RBC # BLD: 4.02 M/UL — LOW (ref 4.2–5.8)
RBC # FLD: 11.8 % — SIGNIFICANT CHANGE UP (ref 10.3–14.5)
SODIUM SERPL-SCNC: 138 MMOL/L — SIGNIFICANT CHANGE UP (ref 135–145)
WBC # BLD: 9.58 K/UL — SIGNIFICANT CHANGE UP (ref 3.8–10.5)
WBC # FLD AUTO: 9.58 K/UL — SIGNIFICANT CHANGE UP (ref 3.8–10.5)

## 2024-11-30 RX ADMIN — ENOXAPARIN SODIUM 40 MILLIGRAM(S): 100 INJECTION SUBCUTANEOUS at 05:28

## 2024-11-30 RX ADMIN — ATORVASTATIN CALCIUM 80 MILLIGRAM(S): 80 TABLET, FILM COATED ORAL at 21:25

## 2024-11-30 RX ADMIN — Medication 75 MILLILITER(S): at 08:16

## 2024-11-30 RX ADMIN — Medication 75 MILLILITER(S): at 21:24

## 2024-11-30 RX ADMIN — TAMSULOSIN HYDROCHLORIDE 0.8 MILLIGRAM(S): 0.4 CAPSULE ORAL at 21:24

## 2024-11-30 RX ADMIN — Medication 81 MILLIGRAM(S): at 08:13

## 2024-12-01 DIAGNOSIS — I63.9 CEREBRAL INFARCTION, UNSPECIFIED: ICD-10-CM

## 2024-12-01 LAB
ALBUMIN SERPL ELPH-MCNC: 3.3 G/DL — SIGNIFICANT CHANGE UP (ref 3.3–5.2)
ALP SERPL-CCNC: 57 U/L — SIGNIFICANT CHANGE UP (ref 40–120)
ALT FLD-CCNC: 12 U/L — SIGNIFICANT CHANGE UP
ANION GAP SERPL CALC-SCNC: 11 MMOL/L — SIGNIFICANT CHANGE UP (ref 5–17)
AST SERPL-CCNC: 14 U/L — SIGNIFICANT CHANGE UP
BILIRUB SERPL-MCNC: 0.6 MG/DL — SIGNIFICANT CHANGE UP (ref 0.4–2)
BUN SERPL-MCNC: 9.4 MG/DL — SIGNIFICANT CHANGE UP (ref 8–20)
CALCIUM SERPL-MCNC: 8.6 MG/DL — SIGNIFICANT CHANGE UP (ref 8.4–10.5)
CHLORIDE SERPL-SCNC: 105 MMOL/L — SIGNIFICANT CHANGE UP (ref 96–108)
CO2 SERPL-SCNC: 26 MMOL/L — SIGNIFICANT CHANGE UP (ref 22–29)
CREAT SERPL-MCNC: 0.42 MG/DL — LOW (ref 0.5–1.3)
EGFR: 122 ML/MIN/1.73M2 — SIGNIFICANT CHANGE UP
EGFR: 122 ML/MIN/1.73M2 — SIGNIFICANT CHANGE UP
GLUCOSE SERPL-MCNC: 88 MG/DL — SIGNIFICANT CHANGE UP (ref 70–99)
HCT VFR BLD CALC: 35.9 % — LOW (ref 39–50)
HGB BLD-MCNC: 11.9 G/DL — LOW (ref 13–17)
MAGNESIUM SERPL-MCNC: 1.9 MG/DL — SIGNIFICANT CHANGE UP (ref 1.6–2.6)
MCHC RBC-ENTMCNC: 31.2 PG — SIGNIFICANT CHANGE UP (ref 27–34)
MCHC RBC-ENTMCNC: 33.1 G/DL — SIGNIFICANT CHANGE UP (ref 32–36)
MCV RBC AUTO: 94.2 FL — SIGNIFICANT CHANGE UP (ref 80–100)
PHOSPHATE SERPL-MCNC: 3.4 MG/DL — SIGNIFICANT CHANGE UP (ref 2.4–4.7)
PLATELET # BLD AUTO: 204 K/UL — SIGNIFICANT CHANGE UP (ref 150–400)
POTASSIUM SERPL-MCNC: 3.8 MMOL/L — SIGNIFICANT CHANGE UP (ref 3.5–5.3)
POTASSIUM SERPL-SCNC: 3.8 MMOL/L — SIGNIFICANT CHANGE UP (ref 3.5–5.3)
PROT SERPL-MCNC: 5.4 G/DL — LOW (ref 6.6–8.7)
RBC # BLD: 3.81 M/UL — LOW (ref 4.2–5.8)
RBC # FLD: 11.9 % — SIGNIFICANT CHANGE UP (ref 10.3–14.5)
SODIUM SERPL-SCNC: 142 MMOL/L — SIGNIFICANT CHANGE UP (ref 135–145)
WBC # BLD: 7.99 K/UL — SIGNIFICANT CHANGE UP (ref 3.8–10.5)
WBC # FLD AUTO: 7.99 K/UL — SIGNIFICANT CHANGE UP (ref 3.8–10.5)

## 2024-12-01 PROCEDURE — 70553 MRI BRAIN STEM W/O & W/DYE: CPT | Mod: 26

## 2024-12-01 PROCEDURE — 74177 CT ABD & PELVIS W/CONTRAST: CPT | Mod: 26

## 2024-12-01 PROCEDURE — 71260 CT THORAX DX C+: CPT | Mod: 26

## 2024-12-01 PROCEDURE — 99222 1ST HOSP IP/OBS MODERATE 55: CPT

## 2024-12-01 PROCEDURE — 70548 MR ANGIOGRAPHY NECK W/DYE: CPT | Mod: 26

## 2024-12-01 PROCEDURE — 70544 MR ANGIOGRAPHY HEAD W/O DYE: CPT | Mod: 26,XU

## 2024-12-01 RX ADMIN — Medication 81 MILLIGRAM(S): at 09:58

## 2024-12-01 RX ADMIN — ENOXAPARIN SODIUM 40 MILLIGRAM(S): 100 INJECTION SUBCUTANEOUS at 06:41

## 2024-12-01 RX ADMIN — TAMSULOSIN HYDROCHLORIDE 0.8 MILLIGRAM(S): 0.4 CAPSULE ORAL at 21:31

## 2024-12-01 RX ADMIN — ATORVASTATIN CALCIUM 80 MILLIGRAM(S): 80 TABLET, FILM COATED ORAL at 21:31

## 2024-12-02 ENCOUNTER — RESULT REVIEW (OUTPATIENT)
Age: 64
End: 2024-12-02

## 2024-12-02 LAB
ALBUMIN SERPL ELPH-MCNC: 3.5 G/DL — SIGNIFICANT CHANGE UP (ref 3.3–5.2)
ALP SERPL-CCNC: 68 U/L — SIGNIFICANT CHANGE UP (ref 40–120)
ALT FLD-CCNC: 16 U/L — SIGNIFICANT CHANGE UP
ANION GAP SERPL CALC-SCNC: 10 MMOL/L — SIGNIFICANT CHANGE UP (ref 5–17)
AST SERPL-CCNC: 16 U/L — SIGNIFICANT CHANGE UP
BASOPHILS # BLD AUTO: 0.02 K/UL — SIGNIFICANT CHANGE UP (ref 0–0.2)
BASOPHILS NFR BLD AUTO: 0.3 % — SIGNIFICANT CHANGE UP (ref 0–2)
BILIRUB SERPL-MCNC: 0.5 MG/DL — SIGNIFICANT CHANGE UP (ref 0.4–2)
BUN SERPL-MCNC: 11.7 MG/DL — SIGNIFICANT CHANGE UP (ref 8–20)
CALCIUM SERPL-MCNC: 9 MG/DL — SIGNIFICANT CHANGE UP (ref 8.4–10.5)
CHLORIDE SERPL-SCNC: 104 MMOL/L — SIGNIFICANT CHANGE UP (ref 96–108)
CO2 SERPL-SCNC: 26 MMOL/L — SIGNIFICANT CHANGE UP (ref 22–29)
CREAT SERPL-MCNC: 0.43 MG/DL — LOW (ref 0.5–1.3)
EGFR: 121 ML/MIN/1.73M2 — SIGNIFICANT CHANGE UP
EGFR: 121 ML/MIN/1.73M2 — SIGNIFICANT CHANGE UP
EOSINOPHIL # BLD AUTO: 0.27 K/UL — SIGNIFICANT CHANGE UP (ref 0–0.5)
EOSINOPHIL NFR BLD AUTO: 3.7 % — SIGNIFICANT CHANGE UP (ref 0–6)
GLUCOSE SERPL-MCNC: 93 MG/DL — SIGNIFICANT CHANGE UP (ref 70–99)
HCT VFR BLD CALC: 37.6 % — LOW (ref 39–50)
HGB BLD-MCNC: 12.8 G/DL — LOW (ref 13–17)
IMM GRANULOCYTES NFR BLD AUTO: 0.1 % — SIGNIFICANT CHANGE UP (ref 0–0.9)
LYMPHOCYTES # BLD AUTO: 1.9 K/UL — SIGNIFICANT CHANGE UP (ref 1–3.3)
LYMPHOCYTES # BLD AUTO: 26.2 % — SIGNIFICANT CHANGE UP (ref 13–44)
MAGNESIUM SERPL-MCNC: 1.9 MG/DL — SIGNIFICANT CHANGE UP (ref 1.6–2.6)
MCHC RBC-ENTMCNC: 31.7 PG — SIGNIFICANT CHANGE UP (ref 27–34)
MCHC RBC-ENTMCNC: 34 G/DL — SIGNIFICANT CHANGE UP (ref 32–36)
MCV RBC AUTO: 93.1 FL — SIGNIFICANT CHANGE UP (ref 80–100)
MONOCYTES # BLD AUTO: 0.51 K/UL — SIGNIFICANT CHANGE UP (ref 0–0.9)
MONOCYTES NFR BLD AUTO: 7 % — SIGNIFICANT CHANGE UP (ref 2–14)
NEUTROPHILS # BLD AUTO: 4.54 K/UL — SIGNIFICANT CHANGE UP (ref 1.8–7.4)
NEUTROPHILS NFR BLD AUTO: 62.7 % — SIGNIFICANT CHANGE UP (ref 43–77)
PHOSPHATE SERPL-MCNC: 3.5 MG/DL — SIGNIFICANT CHANGE UP (ref 2.4–4.7)
PLATELET # BLD AUTO: 200 K/UL — SIGNIFICANT CHANGE UP (ref 150–400)
POTASSIUM SERPL-MCNC: 4 MMOL/L — SIGNIFICANT CHANGE UP (ref 3.5–5.3)
POTASSIUM SERPL-SCNC: 4 MMOL/L — SIGNIFICANT CHANGE UP (ref 3.5–5.3)
PROT SERPL-MCNC: 5.7 G/DL — LOW (ref 6.6–8.7)
RBC # BLD: 4.04 M/UL — LOW (ref 4.2–5.8)
RBC # FLD: 11.7 % — SIGNIFICANT CHANGE UP (ref 10.3–14.5)
SODIUM SERPL-SCNC: 140 MMOL/L — SIGNIFICANT CHANGE UP (ref 135–145)
WBC # BLD: 7.25 K/UL — SIGNIFICANT CHANGE UP (ref 3.8–10.5)
WBC # FLD AUTO: 7.25 K/UL — SIGNIFICANT CHANGE UP (ref 3.8–10.5)

## 2024-12-02 PROCEDURE — 96132 NRPSYC TST EVAL PHYS/QHP 1ST: CPT

## 2024-12-02 PROCEDURE — 99233 SBSQ HOSP IP/OBS HIGH 50: CPT

## 2024-12-02 PROCEDURE — 93325 DOPPLER ECHO COLOR FLOW MAPG: CPT | Mod: 26

## 2024-12-02 PROCEDURE — 93308 TTE F-UP OR LMTD: CPT | Mod: 26

## 2024-12-02 PROCEDURE — 96116 NUBHVL XM PHYS/QHP 1ST HR: CPT

## 2024-12-02 PROCEDURE — 96133 NRPSYC TST EVAL PHYS/QHP EA: CPT

## 2024-12-02 RX ADMIN — GLATIRAMER ACETATE 40 MILLIGRAM(S): 40 INJECTION, SOLUTION SUBCUTANEOUS at 18:18

## 2024-12-02 RX ADMIN — Medication 81 MILLIGRAM(S): at 08:28

## 2024-12-02 RX ADMIN — TAMSULOSIN HYDROCHLORIDE 0.8 MILLIGRAM(S): 0.4 CAPSULE ORAL at 21:40

## 2024-12-02 RX ADMIN — ENOXAPARIN SODIUM 40 MILLIGRAM(S): 100 INJECTION SUBCUTANEOUS at 06:39

## 2024-12-02 RX ADMIN — ATORVASTATIN CALCIUM 80 MILLIGRAM(S): 80 TABLET, FILM COATED ORAL at 21:40

## 2024-12-02 RX ADMIN — Medication 1000 MILLILITER(S): at 19:02

## 2024-12-03 LAB
APTT BLD: 33.4 SEC — SIGNIFICANT CHANGE UP (ref 24.5–35.6)
HCYS SERPL-MCNC: 15.4 UMOL/L — HIGH
INR BLD: 1.02 RATIO — SIGNIFICANT CHANGE UP (ref 0.85–1.16)
PROTHROM AB SERPL-ACNC: 11.8 SEC — SIGNIFICANT CHANGE UP (ref 9.9–13.4)

## 2024-12-03 PROCEDURE — 99233 SBSQ HOSP IP/OBS HIGH 50: CPT

## 2024-12-03 RX ORDER — POLYETHYLENE GLYCOL 3350 17 G/17G
17 POWDER, FOR SOLUTION ORAL DAILY
Refills: 0 | Status: DISCONTINUED | OUTPATIENT
Start: 2024-12-03 | End: 2024-12-07

## 2024-12-03 RX ORDER — SENNA 187 MG
2 TABLET ORAL AT BEDTIME
Refills: 0 | Status: DISCONTINUED | OUTPATIENT
Start: 2024-12-04 | End: 2024-12-07

## 2024-12-03 RX ORDER — SENNA 187 MG
2 TABLET ORAL DAILY
Refills: 0 | Status: DISCONTINUED | OUTPATIENT
Start: 2024-12-03 | End: 2024-12-03

## 2024-12-03 RX ORDER — SENNA 187 MG
2 TABLET ORAL ONCE
Refills: 0 | Status: COMPLETED | OUTPATIENT
Start: 2024-12-03 | End: 2024-12-03

## 2024-12-03 RX ORDER — BISACODYL 5 MG
10 TABLET, DELAYED RELEASE (ENTERIC COATED) ORAL DAILY
Refills: 0 | Status: DISCONTINUED | OUTPATIENT
Start: 2024-12-03 | End: 2024-12-07

## 2024-12-03 RX ADMIN — POLYETHYLENE GLYCOL 3350 17 GRAM(S): 17 POWDER, FOR SOLUTION ORAL at 08:51

## 2024-12-03 RX ADMIN — Medication 10 MILLIGRAM(S): at 16:59

## 2024-12-03 RX ADMIN — ATORVASTATIN CALCIUM 80 MILLIGRAM(S): 80 TABLET, FILM COATED ORAL at 22:29

## 2024-12-03 RX ADMIN — ENOXAPARIN SODIUM 40 MILLIGRAM(S): 100 INJECTION SUBCUTANEOUS at 05:19

## 2024-12-03 RX ADMIN — TAMSULOSIN HYDROCHLORIDE 0.8 MILLIGRAM(S): 0.4 CAPSULE ORAL at 22:29

## 2024-12-03 RX ADMIN — Medication 81 MILLIGRAM(S): at 08:51

## 2024-12-03 RX ADMIN — Medication 2 TABLET(S): at 08:51

## 2024-12-04 LAB
ALBUMIN SERPL ELPH-MCNC: 3.6 G/DL — SIGNIFICANT CHANGE UP (ref 3.3–5.2)
ALP SERPL-CCNC: 77 U/L — SIGNIFICANT CHANGE UP (ref 40–120)
ALT FLD-CCNC: 23 U/L — SIGNIFICANT CHANGE UP
ANA TITR SER: NEGATIVE — SIGNIFICANT CHANGE UP
ANION GAP SERPL CALC-SCNC: 9 MMOL/L — SIGNIFICANT CHANGE UP (ref 5–17)
AST SERPL-CCNC: 23 U/L — SIGNIFICANT CHANGE UP
AT III ACT/NOR PPP CHRO: 115 % — SIGNIFICANT CHANGE UP (ref 85–135)
B2 GLYCOPROT1 IGA SER QL: 2.3 U/ML — SIGNIFICANT CHANGE UP
B2 GLYCOPROT1 IGG SER-ACNC: <1.4 U/ML — SIGNIFICANT CHANGE UP
B2 GLYCOPROT1 IGM SER-ACNC: <1.5 U/ML — SIGNIFICANT CHANGE UP
BASOPHILS # BLD AUTO: 0.02 K/UL — SIGNIFICANT CHANGE UP (ref 0–0.2)
BASOPHILS NFR BLD AUTO: 0.2 % — SIGNIFICANT CHANGE UP (ref 0–2)
BILIRUB SERPL-MCNC: 0.6 MG/DL — SIGNIFICANT CHANGE UP (ref 0.4–2)
BUN SERPL-MCNC: 12.9 MG/DL — SIGNIFICANT CHANGE UP (ref 8–20)
CALCIUM SERPL-MCNC: 9 MG/DL — SIGNIFICANT CHANGE UP (ref 8.4–10.5)
CARDIOLIPIN IGM SER-MCNC: <1.5 MPL U/ML — SIGNIFICANT CHANGE UP
CARDIOLIPIN IGM SER-MCNC: <1.6 GPL U/ML — SIGNIFICANT CHANGE UP
CHLORIDE SERPL-SCNC: 102 MMOL/L — SIGNIFICANT CHANGE UP (ref 96–108)
CO2 SERPL-SCNC: 27 MMOL/L — SIGNIFICANT CHANGE UP (ref 22–29)
CREAT SERPL-MCNC: 0.51 MG/DL — SIGNIFICANT CHANGE UP (ref 0.5–1.3)
DEPRECATED CARDIOLIPIN IGA SER: <2 APL U/ML — SIGNIFICANT CHANGE UP
DRVVT RATIO: 1.01 RATIO — SIGNIFICANT CHANGE UP (ref 0–1.21)
DRVVT SCREEN TO CONFIRM RATIO: SIGNIFICANT CHANGE UP
EGFR: 115 ML/MIN/1.73M2 — SIGNIFICANT CHANGE UP
EGFR: 115 ML/MIN/1.73M2 — SIGNIFICANT CHANGE UP
EOSINOPHIL # BLD AUTO: 0.25 K/UL — SIGNIFICANT CHANGE UP (ref 0–0.5)
EOSINOPHIL NFR BLD AUTO: 2.8 % — SIGNIFICANT CHANGE UP (ref 0–6)
GLUCOSE SERPL-MCNC: 95 MG/DL — SIGNIFICANT CHANGE UP (ref 70–99)
HCT VFR BLD CALC: 37.5 % — LOW (ref 39–50)
HGB BLD-MCNC: 12.8 G/DL — LOW (ref 13–17)
IMM GRANULOCYTES NFR BLD AUTO: 0.4 % — SIGNIFICANT CHANGE UP (ref 0–0.9)
LYMPHOCYTES # BLD AUTO: 2.11 K/UL — SIGNIFICANT CHANGE UP (ref 1–3.3)
LYMPHOCYTES # BLD AUTO: 23.2 % — SIGNIFICANT CHANGE UP (ref 13–44)
MAGNESIUM SERPL-MCNC: 2.1 MG/DL — SIGNIFICANT CHANGE UP (ref 1.8–2.6)
MCHC RBC-ENTMCNC: 31.8 PG — SIGNIFICANT CHANGE UP (ref 27–34)
MCHC RBC-ENTMCNC: 34.1 G/DL — SIGNIFICANT CHANGE UP (ref 32–36)
MCV RBC AUTO: 93.3 FL — SIGNIFICANT CHANGE UP (ref 80–100)
MONOCYTES # BLD AUTO: 0.72 K/UL — SIGNIFICANT CHANGE UP (ref 0–0.9)
MONOCYTES NFR BLD AUTO: 7.9 % — SIGNIFICANT CHANGE UP (ref 2–14)
NEUTROPHILS # BLD AUTO: 5.94 K/UL — SIGNIFICANT CHANGE UP (ref 1.8–7.4)
NEUTROPHILS NFR BLD AUTO: 65.5 % — SIGNIFICANT CHANGE UP (ref 43–77)
PHOSPHATE SERPL-MCNC: 3.1 MG/DL — SIGNIFICANT CHANGE UP (ref 2.4–4.7)
PLATELET # BLD AUTO: 232 K/UL — SIGNIFICANT CHANGE UP (ref 150–400)
POTASSIUM SERPL-MCNC: 4.1 MMOL/L — SIGNIFICANT CHANGE UP (ref 3.5–5.3)
POTASSIUM SERPL-SCNC: 4.1 MMOL/L — SIGNIFICANT CHANGE UP (ref 3.5–5.3)
PROT C ACT/NOR PPP: 138 % — SIGNIFICANT CHANGE UP (ref 74–150)
PROT SERPL-MCNC: 5.9 G/DL — LOW (ref 6.6–8.7)
RBC # BLD: 4.02 M/UL — LOW (ref 4.2–5.8)
RBC # FLD: 11.9 % — SIGNIFICANT CHANGE UP (ref 10.3–14.5)
SODIUM SERPL-SCNC: 138 MMOL/L — SIGNIFICANT CHANGE UP (ref 135–145)
WBC # BLD: 9.08 K/UL — SIGNIFICANT CHANGE UP (ref 3.8–10.5)
WBC # FLD AUTO: 9.08 K/UL — SIGNIFICANT CHANGE UP (ref 3.8–10.5)

## 2024-12-04 PROCEDURE — 99233 SBSQ HOSP IP/OBS HIGH 50: CPT

## 2024-12-04 PROCEDURE — 99232 SBSQ HOSP IP/OBS MODERATE 35: CPT

## 2024-12-04 RX ADMIN — Medication 2 TABLET(S): at 21:57

## 2024-12-04 RX ADMIN — ENOXAPARIN SODIUM 40 MILLIGRAM(S): 100 INJECTION SUBCUTANEOUS at 07:00

## 2024-12-04 RX ADMIN — Medication 10 MILLIGRAM(S): at 02:07

## 2024-12-04 RX ADMIN — GLATIRAMER ACETATE 40 MILLIGRAM(S): 40 INJECTION, SOLUTION SUBCUTANEOUS at 09:11

## 2024-12-04 RX ADMIN — ATORVASTATIN CALCIUM 80 MILLIGRAM(S): 80 TABLET, FILM COATED ORAL at 21:56

## 2024-12-04 RX ADMIN — POLYETHYLENE GLYCOL 3350 17 GRAM(S): 17 POWDER, FOR SOLUTION ORAL at 08:26

## 2024-12-04 RX ADMIN — TAMSULOSIN HYDROCHLORIDE 0.8 MILLIGRAM(S): 0.4 CAPSULE ORAL at 21:57

## 2024-12-04 RX ADMIN — Medication 81 MILLIGRAM(S): at 08:25

## 2024-12-05 LAB
ANION GAP SERPL CALC-SCNC: 10 MMOL/L — SIGNIFICANT CHANGE UP (ref 5–17)
BASOPHILS # BLD AUTO: 0.02 K/UL — SIGNIFICANT CHANGE UP (ref 0–0.2)
BASOPHILS NFR BLD AUTO: 0.2 % — SIGNIFICANT CHANGE UP (ref 0–2)
BUN SERPL-MCNC: 15 MG/DL — SIGNIFICANT CHANGE UP (ref 8–20)
CALCIUM SERPL-MCNC: 9 MG/DL — SIGNIFICANT CHANGE UP (ref 8.4–10.5)
CHLORIDE SERPL-SCNC: 102 MMOL/L — SIGNIFICANT CHANGE UP (ref 96–108)
CO2 SERPL-SCNC: 25 MMOL/L — SIGNIFICANT CHANGE UP (ref 22–29)
CREAT SERPL-MCNC: 0.45 MG/DL — LOW (ref 0.5–1.3)
EGFR: 119 ML/MIN/1.73M2 — SIGNIFICANT CHANGE UP
EGFR: 119 ML/MIN/1.73M2 — SIGNIFICANT CHANGE UP
EOSINOPHIL # BLD AUTO: 0.32 K/UL — SIGNIFICANT CHANGE UP (ref 0–0.5)
EOSINOPHIL NFR BLD AUTO: 3.8 % — SIGNIFICANT CHANGE UP (ref 0–6)
GLUCOSE SERPL-MCNC: 88 MG/DL — SIGNIFICANT CHANGE UP (ref 70–99)
HCT VFR BLD CALC: 37.7 % — LOW (ref 39–50)
HGB BLD-MCNC: 12.8 G/DL — LOW (ref 13–17)
IMM GRANULOCYTES NFR BLD AUTO: 0.4 % — SIGNIFICANT CHANGE UP (ref 0–0.9)
LYMPHOCYTES # BLD AUTO: 2.12 K/UL — SIGNIFICANT CHANGE UP (ref 1–3.3)
LYMPHOCYTES # BLD AUTO: 25.4 % — SIGNIFICANT CHANGE UP (ref 13–44)
MAGNESIUM SERPL-MCNC: 2 MG/DL — SIGNIFICANT CHANGE UP (ref 1.6–2.6)
MCHC RBC-ENTMCNC: 31.8 PG — SIGNIFICANT CHANGE UP (ref 27–34)
MCHC RBC-ENTMCNC: 34 G/DL — SIGNIFICANT CHANGE UP (ref 32–36)
MCV RBC AUTO: 93.5 FL — SIGNIFICANT CHANGE UP (ref 80–100)
MONOCYTES # BLD AUTO: 0.63 K/UL — SIGNIFICANT CHANGE UP (ref 0–0.9)
MONOCYTES NFR BLD AUTO: 7.6 % — SIGNIFICANT CHANGE UP (ref 2–14)
NEUTROPHILS # BLD AUTO: 5.22 K/UL — SIGNIFICANT CHANGE UP (ref 1.8–7.4)
NEUTROPHILS NFR BLD AUTO: 62.6 % — SIGNIFICANT CHANGE UP (ref 43–77)
PHOSPHATE SERPL-MCNC: 2.9 MG/DL — SIGNIFICANT CHANGE UP (ref 2.4–4.7)
PLATELET # BLD AUTO: 230 K/UL — SIGNIFICANT CHANGE UP (ref 150–400)
POTASSIUM SERPL-MCNC: 3.8 MMOL/L — SIGNIFICANT CHANGE UP (ref 3.5–5.3)
POTASSIUM SERPL-SCNC: 3.8 MMOL/L — SIGNIFICANT CHANGE UP (ref 3.5–5.3)
RBC # BLD: 4.03 M/UL — LOW (ref 4.2–5.8)
RBC # FLD: 11.9 % — SIGNIFICANT CHANGE UP (ref 10.3–14.5)
SODIUM SERPL-SCNC: 137 MMOL/L — SIGNIFICANT CHANGE UP (ref 135–145)
WBC # BLD: 8.34 K/UL — SIGNIFICANT CHANGE UP (ref 3.8–10.5)
WBC # FLD AUTO: 8.34 K/UL — SIGNIFICANT CHANGE UP (ref 3.8–10.5)

## 2024-12-05 PROCEDURE — 99232 SBSQ HOSP IP/OBS MODERATE 35: CPT

## 2024-12-05 PROCEDURE — 93970 EXTREMITY STUDY: CPT | Mod: 26

## 2024-12-05 PROCEDURE — 74177 CT ABD & PELVIS W/CONTRAST: CPT | Mod: 26

## 2024-12-05 RX ORDER — MAGNESIUM, ALUMINUM HYDROXIDE 200-200 MG
30 TABLET,CHEWABLE ORAL EVERY 4 HOURS
Refills: 0 | Status: DISCONTINUED | OUTPATIENT
Start: 2024-12-05 | End: 2024-12-07

## 2024-12-05 RX ADMIN — Medication 2 TABLET(S): at 22:04

## 2024-12-05 RX ADMIN — Medication 81 MILLIGRAM(S): at 08:34

## 2024-12-05 RX ADMIN — ENOXAPARIN SODIUM 40 MILLIGRAM(S): 100 INJECTION SUBCUTANEOUS at 07:04

## 2024-12-05 RX ADMIN — TAMSULOSIN HYDROCHLORIDE 0.8 MILLIGRAM(S): 0.4 CAPSULE ORAL at 22:05

## 2024-12-05 RX ADMIN — ATORVASTATIN CALCIUM 80 MILLIGRAM(S): 80 TABLET, FILM COATED ORAL at 23:04

## 2024-12-05 RX ADMIN — POLYETHYLENE GLYCOL 3350 17 GRAM(S): 17 POWDER, FOR SOLUTION ORAL at 08:34

## 2024-12-06 ENCOUNTER — TRANSCRIPTION ENCOUNTER (OUTPATIENT)
Age: 64
End: 2024-12-06

## 2024-12-06 LAB
APCR PPP: 2.5 RATIO — SIGNIFICANT CHANGE UP
PROT S FREE PPP-ACNC: 86 % — SIGNIFICANT CHANGE UP (ref 63–140)

## 2024-12-06 PROCEDURE — 99232 SBSQ HOSP IP/OBS MODERATE 35: CPT

## 2024-12-06 RX ORDER — GLATIRAMER ACETATE 40 MG/ML
40 INJECTION, SOLUTION SUBCUTANEOUS
Qty: 12 | Refills: 0
Start: 2024-12-06

## 2024-12-06 RX ORDER — SENNA 187 MG
2 TABLET ORAL
Qty: 0 | Refills: 0 | DISCHARGE
Start: 2024-12-06

## 2024-12-06 RX ORDER — TAMSULOSIN HYDROCHLORIDE 0.4 MG/1
2 CAPSULE ORAL
Qty: 0 | Refills: 0 | DISCHARGE
Start: 2024-12-06

## 2024-12-06 RX ORDER — ATORVASTATIN CALCIUM 80 MG/1
1 TABLET, FILM COATED ORAL
Qty: 0 | Refills: 0 | DISCHARGE
Start: 2024-12-06

## 2024-12-06 RX ORDER — POLYETHYLENE GLYCOL 3350 17 G/17G
17 POWDER, FOR SOLUTION ORAL
Qty: 0 | Refills: 0 | DISCHARGE
Start: 2024-12-06

## 2024-12-06 RX ORDER — ACETAMINOPHEN 500 MG/5ML
2 LIQUID (ML) ORAL
Qty: 0 | Refills: 0 | DISCHARGE
Start: 2024-12-06

## 2024-12-06 RX ORDER — MAGNESIUM, ALUMINUM HYDROXIDE 200-200 MG
30 TABLET,CHEWABLE ORAL
Qty: 0 | Refills: 0 | DISCHARGE
Start: 2024-12-06

## 2024-12-06 RX ORDER — ASPIRIN 325 MG
1 TABLET ORAL
Qty: 0 | Refills: 0 | DISCHARGE
Start: 2024-12-06

## 2024-12-06 RX ADMIN — Medication 40 MILLIGRAM(S): at 06:14

## 2024-12-06 RX ADMIN — POLYETHYLENE GLYCOL 3350 17 GRAM(S): 17 POWDER, FOR SOLUTION ORAL at 09:13

## 2024-12-06 RX ADMIN — GLATIRAMER ACETATE 40 MILLIGRAM(S): 40 INJECTION, SOLUTION SUBCUTANEOUS at 09:12

## 2024-12-06 RX ADMIN — Medication 2 TABLET(S): at 21:32

## 2024-12-06 RX ADMIN — ATORVASTATIN CALCIUM 80 MILLIGRAM(S): 80 TABLET, FILM COATED ORAL at 21:32

## 2024-12-06 RX ADMIN — Medication 10 MILLIGRAM(S): at 03:22

## 2024-12-06 RX ADMIN — ENOXAPARIN SODIUM 40 MILLIGRAM(S): 100 INJECTION SUBCUTANEOUS at 06:13

## 2024-12-06 RX ADMIN — TAMSULOSIN HYDROCHLORIDE 0.8 MILLIGRAM(S): 0.4 CAPSULE ORAL at 21:31

## 2024-12-06 RX ADMIN — Medication 81 MILLIGRAM(S): at 09:12

## 2024-12-07 VITALS
DIASTOLIC BLOOD PRESSURE: 65 MMHG | TEMPERATURE: 98 F | OXYGEN SATURATION: 99 % | SYSTOLIC BLOOD PRESSURE: 102 MMHG | HEART RATE: 69 BPM | RESPIRATION RATE: 16 BRPM

## 2024-12-07 PROCEDURE — 70553 MRI BRAIN STEM W/O & W/DYE: CPT | Mod: MC

## 2024-12-07 PROCEDURE — 87637 SARSCOV2&INF A&B&RSV AMP PRB: CPT

## 2024-12-07 PROCEDURE — 80061 LIPID PANEL: CPT

## 2024-12-07 PROCEDURE — 85613 RUSSELL VIPER VENOM DILUTED: CPT

## 2024-12-07 PROCEDURE — 71260 CT THORAX DX C+: CPT | Mod: MC

## 2024-12-07 PROCEDURE — 70544 MR ANGIOGRAPHY HEAD W/O DYE: CPT

## 2024-12-07 PROCEDURE — 36415 COLL VENOUS BLD VENIPUNCTURE: CPT

## 2024-12-07 PROCEDURE — 97535 SELF CARE MNGMENT TRAINING: CPT

## 2024-12-07 PROCEDURE — 80307 DRUG TEST PRSMV CHEM ANLYZR: CPT

## 2024-12-07 PROCEDURE — 85307 ASSAY ACTIVATED PROTEIN C: CPT

## 2024-12-07 PROCEDURE — 85027 COMPLETE CBC AUTOMATED: CPT

## 2024-12-07 PROCEDURE — 97167 OT EVAL HIGH COMPLEX 60 MIN: CPT

## 2024-12-07 PROCEDURE — 84100 ASSAY OF PHOSPHORUS: CPT

## 2024-12-07 PROCEDURE — 85025 COMPLETE CBC W/AUTO DIFF WBC: CPT

## 2024-12-07 PROCEDURE — 70548 MR ANGIOGRAPHY NECK W/DYE: CPT

## 2024-12-07 PROCEDURE — 80053 COMPREHEN METABOLIC PANEL: CPT

## 2024-12-07 PROCEDURE — 84443 ASSAY THYROID STIM HORMONE: CPT

## 2024-12-07 PROCEDURE — 80048 BASIC METABOLIC PNL TOTAL CA: CPT

## 2024-12-07 PROCEDURE — 87086 URINE CULTURE/COLONY COUNT: CPT

## 2024-12-07 PROCEDURE — 74177 CT ABD & PELVIS W/CONTRAST: CPT | Mod: MC

## 2024-12-07 PROCEDURE — 83735 ASSAY OF MAGNESIUM: CPT

## 2024-12-07 PROCEDURE — 72125 CT NECK SPINE W/O DYE: CPT | Mod: MC

## 2024-12-07 PROCEDURE — 93005 ELECTROCARDIOGRAM TRACING: CPT

## 2024-12-07 PROCEDURE — 93306 TTE W/DOPPLER COMPLETE: CPT

## 2024-12-07 PROCEDURE — 77075 RADEX OSSEOUS SURVEY COMPL: CPT

## 2024-12-07 PROCEDURE — 93308 TTE F-UP OR LMTD: CPT

## 2024-12-07 PROCEDURE — 85730 THROMBOPLASTIN TIME PARTIAL: CPT

## 2024-12-07 PROCEDURE — 83090 ASSAY OF HOMOCYSTEINE: CPT

## 2024-12-07 PROCEDURE — 81001 URINALYSIS AUTO W/SCOPE: CPT

## 2024-12-07 PROCEDURE — 85306 CLOT INHIBIT PROT S FREE: CPT

## 2024-12-07 PROCEDURE — 99285 EMERGENCY DEPT VISIT HI MDM: CPT

## 2024-12-07 PROCEDURE — 92523 SPEECH SOUND LANG COMPREHEN: CPT

## 2024-12-07 PROCEDURE — 83036 HEMOGLOBIN GLYCOSYLATED A1C: CPT

## 2024-12-07 PROCEDURE — 97530 THERAPEUTIC ACTIVITIES: CPT

## 2024-12-07 PROCEDURE — 70450 CT HEAD/BRAIN W/O DYE: CPT | Mod: MC

## 2024-12-07 PROCEDURE — 99232 SBSQ HOSP IP/OBS MODERATE 35: CPT

## 2024-12-07 PROCEDURE — 85300 ANTITHROMBIN III ACTIVITY: CPT

## 2024-12-07 PROCEDURE — 71045 X-RAY EXAM CHEST 1 VIEW: CPT

## 2024-12-07 PROCEDURE — 93970 EXTREMITY STUDY: CPT

## 2024-12-07 PROCEDURE — 93325 DOPPLER ECHO COLOR FLOW MAPG: CPT

## 2024-12-07 PROCEDURE — 85610 PROTHROMBIN TIME: CPT

## 2024-12-07 PROCEDURE — 86038 ANTINUCLEAR ANTIBODIES: CPT

## 2024-12-07 PROCEDURE — 86147 CARDIOLIPIN ANTIBODY EA IG: CPT

## 2024-12-07 PROCEDURE — 80076 HEPATIC FUNCTION PANEL: CPT

## 2024-12-07 PROCEDURE — 86146 BETA-2 GLYCOPROTEIN ANTIBODY: CPT

## 2024-12-07 PROCEDURE — 85303 CLOT INHIBIT PROT C ACTIVITY: CPT

## 2024-12-07 RX ADMIN — ENOXAPARIN SODIUM 40 MILLIGRAM(S): 100 INJECTION SUBCUTANEOUS at 05:11

## 2024-12-07 RX ADMIN — POLYETHYLENE GLYCOL 3350 17 GRAM(S): 17 POWDER, FOR SOLUTION ORAL at 13:05

## 2024-12-07 RX ADMIN — Medication 40 MILLIGRAM(S): at 05:11

## 2024-12-07 RX ADMIN — Medication 81 MILLIGRAM(S): at 13:05

## 2024-12-27 ENCOUNTER — EMERGENCY (EMERGENCY)
Facility: HOSPITAL | Age: 64
LOS: 1 days | Discharge: HOME | End: 2024-12-27
Attending: EMERGENCY MEDICINE
Payer: MEDICARE

## 2024-12-27 VITALS
HEART RATE: 86 BPM | DIASTOLIC BLOOD PRESSURE: 74 MMHG | OXYGEN SATURATION: 97 % | HEIGHT: 67 IN | RESPIRATION RATE: 18 BRPM | TEMPERATURE: 98 F | SYSTOLIC BLOOD PRESSURE: 107 MMHG

## 2024-12-27 DIAGNOSIS — Z98.890 OTHER SPECIFIED POSTPROCEDURAL STATES: Chronic | ICD-10-CM

## 2024-12-27 LAB
ALBUMIN SERPL ELPH-MCNC: 2.8 G/DL — LOW (ref 3.3–5.2)
ALP SERPL-CCNC: 107 U/L — SIGNIFICANT CHANGE UP (ref 40–120)
ALT FLD-CCNC: 35 U/L — SIGNIFICANT CHANGE UP
ANION GAP SERPL CALC-SCNC: 9 MMOL/L — SIGNIFICANT CHANGE UP (ref 5–17)
AST SERPL-CCNC: 28 U/L — SIGNIFICANT CHANGE UP
BASOPHILS # BLD AUTO: 0 K/UL — SIGNIFICANT CHANGE UP (ref 0–0.2)
BASOPHILS NFR BLD AUTO: 0 % — SIGNIFICANT CHANGE UP (ref 0–2)
BILIRUB SERPL-MCNC: 0.3 MG/DL — LOW (ref 0.4–2)
BUN SERPL-MCNC: 13.4 MG/DL — SIGNIFICANT CHANGE UP (ref 8–20)
BURR CELLS BLD QL SMEAR: PRESENT — SIGNIFICANT CHANGE UP
CALCIUM SERPL-MCNC: 8.4 MG/DL — SIGNIFICANT CHANGE UP (ref 8.4–10.5)
CHLORIDE SERPL-SCNC: 96 MMOL/L — SIGNIFICANT CHANGE UP (ref 96–108)
CO2 SERPL-SCNC: 30 MMOL/L — HIGH (ref 22–29)
CREAT SERPL-MCNC: 0.52 MG/DL — SIGNIFICANT CHANGE UP (ref 0.5–1.3)
EGFR: 114 ML/MIN/1.73M2 — SIGNIFICANT CHANGE UP
EOSINOPHIL # BLD AUTO: 0 K/UL — SIGNIFICANT CHANGE UP (ref 0–0.5)
EOSINOPHIL NFR BLD AUTO: 0 % — SIGNIFICANT CHANGE UP (ref 0–6)
GIANT PLATELETS BLD QL SMEAR: PRESENT — SIGNIFICANT CHANGE UP
GLUCOSE SERPL-MCNC: 132 MG/DL — HIGH (ref 70–99)
HCT VFR BLD CALC: 32.1 % — LOW (ref 39–50)
HGB BLD-MCNC: 11.1 G/DL — LOW (ref 13–17)
LACTATE BLDV-MCNC: 1 MMOL/L — SIGNIFICANT CHANGE UP (ref 0.5–2)
LYMPHOCYTES # BLD AUTO: 0.56 K/UL — LOW (ref 1–3.3)
LYMPHOCYTES # BLD AUTO: 1.7 % — LOW (ref 13–44)
MANUAL SMEAR VERIFICATION: SIGNIFICANT CHANGE UP
MCHC RBC-ENTMCNC: 31.6 PG — SIGNIFICANT CHANGE UP (ref 27–34)
MCHC RBC-ENTMCNC: 34.6 G/DL — SIGNIFICANT CHANGE UP (ref 32–36)
MCV RBC AUTO: 91.5 FL — SIGNIFICANT CHANGE UP (ref 80–100)
MONOCYTES # BLD AUTO: 0 K/UL — SIGNIFICANT CHANGE UP (ref 0–0.9)
MONOCYTES NFR BLD AUTO: 0 % — LOW (ref 2–14)
NEUTROPHILS # BLD AUTO: 31.67 K/UL — HIGH (ref 1.8–7.4)
NEUTROPHILS NFR BLD AUTO: 96.6 % — HIGH (ref 43–77)
PLAT MORPH BLD: NORMAL — SIGNIFICANT CHANGE UP
PLATELET # BLD AUTO: 199 K/UL — SIGNIFICANT CHANGE UP (ref 150–400)
POIKILOCYTOSIS BLD QL AUTO: SIGNIFICANT CHANGE UP
POLYCHROMASIA BLD QL SMEAR: SLIGHT — SIGNIFICANT CHANGE UP
POTASSIUM SERPL-MCNC: 3.5 MMOL/L — SIGNIFICANT CHANGE UP (ref 3.5–5.3)
POTASSIUM SERPL-SCNC: 3.5 MMOL/L — SIGNIFICANT CHANGE UP (ref 3.5–5.3)
PROT SERPL-MCNC: 6.1 G/DL — LOW (ref 6.6–8.7)
RAPID RVP RESULT: SIGNIFICANT CHANGE UP
RBC # BLD: 3.51 M/UL — LOW (ref 4.2–5.8)
RBC # FLD: 12.1 % — SIGNIFICANT CHANGE UP (ref 10.3–14.5)
RBC BLD AUTO: ABNORMAL
SARS-COV-2 RNA SPEC QL NAA+PROBE: SIGNIFICANT CHANGE UP
SODIUM SERPL-SCNC: 135 MMOL/L — SIGNIFICANT CHANGE UP (ref 135–145)
VARIANT LYMPHS # BLD: 1.7 % — SIGNIFICANT CHANGE UP (ref 0–6)
WBC # BLD: 32.78 K/UL — HIGH (ref 3.8–10.5)
WBC # FLD AUTO: 32.78 K/UL — HIGH (ref 3.8–10.5)

## 2024-12-27 PROCEDURE — 71046 X-RAY EXAM CHEST 2 VIEWS: CPT | Mod: 26

## 2024-12-27 PROCEDURE — 71260 CT THORAX DX C+: CPT | Mod: 26,MC

## 2024-12-27 PROCEDURE — 74177 CT ABD & PELVIS W/CONTRAST: CPT | Mod: 26,MC

## 2024-12-27 PROCEDURE — 99285 EMERGENCY DEPT VISIT HI MDM: CPT

## 2024-12-27 NOTE — ED PROVIDER NOTE - NS_EDPROVIDERDISPOUSERTYPE_ED_A_ED
What Is The Reason For Today's Visit?: Full Body Skin Examination
What Is The Reason For Today's Visit? (Being Monitored For X): concerning skin lesions on an annual basis
How Severe Are Your Spot(S)?: mild
Attending Attestation (For Attendings USE Only)...

## 2024-12-27 NOTE — ED ADULT NURSE NOTE - NSFALLUNIVINTERV_ED_ALL_ED
Bed/Stretcher in lowest position, wheels locked, appropriate side rails in place/Call bell, personal items and telephone in reach/Instruct patient to call for assistance before getting out of bed/chair/stretcher/Non-slip footwear applied when patient is off stretcher/[place] to call system/Physically safe environment - no spills, clutter or unnecessary equipment/Purposeful proactive rounding/Room/bathroom lighting operational, light cord in reach

## 2024-12-27 NOTE — ED ADULT NURSE NOTE - OBJECTIVE STATEMENT
Pt A&OX2 at baseline. Came in from [facility] for elevated WBC. Pt has no complaints at this time. VS stable, RR even and unlabored, safety maintained.

## 2024-12-27 NOTE — ED PROVIDER NOTE - NSFOLLOWUPINSTRUCTIONS_ED_ALL_ED_FT
1. Follow up with your primary care physician within 2-3days for reevaluation.  2.  Return to the Emergency Department immediately for any worsening, progressive or concerning symptoms.   3. Continue with antibiotics for the next 12 days.     Kidney Infection    WHAT YOU NEED TO KNOW:    A kidney infection, or pyelonephritis, is a bacterial infection. The infection usually starts in your bladder or urethra and moves into your kidney. One or both kidneys may be infected. Kidney, Ureters, Bladder         DISCHARGE INSTRUCTIONS:    Return to the emergency department if:     You have a fever and chills.       You cannot stop vomiting.      You have severe pain in your abdomen, lower back, or sides.    Contact your healthcare provider if:     You continue to have a fever after you take antibiotics for 3 days.      You have pain when you urinate, even after treatment.      Your signs and symptoms return.      You have questions or concerns about your condition or care.    Medicines: You may need any of the following:     Antibiotics treat your bacterial infection.       Acetaminophen decreases pain and fever. It is available without a doctor's order. Ask how much to take and how often to take it. Follow directions. Read the labels of all other medicines you are using to see if they also contain acetaminophen, or ask your doctor or pharmacist. Acetaminophen can cause liver damage if not taken correctly. Do not use more than 4 grams (4,000 milligrams) total of acetaminophen in one day.       NSAIDs, such as ibuprofen, help decrease swelling, pain, and fever. This medicine is available with or without a doctor's order. NSAIDs can cause stomach bleeding or kidney problems in certain people. If you take blood thinner medicine, always ask if NSAIDs are safe for you. Always read the medicine label and follow directions. Do not give these medicines to children under 6 months of age without direction from your child's healthcare provider.      Prescription pain medicine may be given. Ask how to take this medicine safely.      Take your medicine as directed. Contact your healthcare provider if you think your medicine is not helping or if you have side effects. Tell him of her if you are allergic to any medicine. Keep a list of the medicines, vitamins, and herbs you take. Include the amounts, and when and why you take them. Bring the list or the pill bottles to follow-up visits. Carry your medicine list with you in case of an emergency.    Drink liquids as directed: You may need to drink extra liquids to help flush your kidneys and urinary system. Water is the best liquid to drink. Ask your healthcare provider how much liquid to drink each day and which liquids are best for you.    Urinate as soon as you feel the urge: This will help flush bacteria from your urinary system. Do not wait or hold your urine for too long.     Clean your genital area every day with soap and water: Wipe from front to back after you urinate or have a bowel movement. Wear cotton underwear. Fabrics such as nylon and polyester can stay damp. This can increase your risk for infection. Urinate within 15 minutes after you have sex.    Follow up with your healthcare provider as directed: Write down your questions so you remember to ask them during your visits.

## 2024-12-27 NOTE — ED PROVIDER NOTE - PHYSICAL EXAMINATION
Gen: NAD, AOx3  Head: NCAT  HEENT: oral mucosa moist, normal conjunctiva, oropharynx clear without exudate or erythema  Lung: CTAB, no respiratory distress, no wheezing, rales, rhonchi  CV: normal s1/s2, rrr, no murmurs, Normal perfusion, pulses 2+ throughout  Abd: soft, NTND  MSK: No edema, no visible deformities, full range of motion in all 4 extremities  Skin: No rash   Psych: normal affect

## 2024-12-27 NOTE — ED PROVIDER NOTE - CLINICAL SUMMARY MEDICAL DECISION MAKING FREE TEXT BOX
Patient presenting with abnormal labs from [facility]- attempted to call for additional collateral but no one is answering phone. Afebrile well appearing with no complaints. Will check basic labs, UA, reassess. Patient presenting with abnormal labs from [facility]- attempted to call for additional collateral but no one is answering phone. Afebrile well appearing with no complaints. Abd mildly distended but nontender. Will check basic labs, UA, reassess.    Yi Xiong DO: Progress note @2057- spoke with nursing supervisor. Patient with leukocytosis at [facility]- 38.5>43.5 today. Has been on Zosyn empirically since 12/25, unclear source. Patient to have CT a/p, blood culture added to workup. Patient presenting with abnormal labs from [facility]- attempted to call for additional collateral but no one is answering phone. Afebrile well appearing with no complaints. Abd mildly distended but nontender. Will check basic labs, UA, reassess.    Yi Xiong DO: Progress note @2057- spoke with nursing supervisor. Patient with leukocytosis at [facility]- 38.5>43.5 today. Has been on Zosyn empirically since 12/25, unclear source. Patient to have CT a/p, blood culture added to workup.    Patient reassessed at 2:08AM: CT abdomen pelvis shows possible early pyelonephritis, UA positive, although possibly contaminated due to chronic indwelling Heart catheter.  Repeat WBC count 32 in ED, downtrending from 43 from [facility].  The patient has remained hemodynamically stable here he is afebrile, has no signs or symptoms of sepsis other than the leukocytosis.  I discussed the case with nursing supervisor, Irma, who will continue antibiotics at [facility].  patient to be transferred back to [facility], return to ED for new progressive or concerning symptoms. Patient presenting with abnormal labs from [facility]- attempted to call for additional collateral but no one is answering phone. Afebrile well appearing with no complaints. Abd mildly distended but nontender. Will check basic labs, UA, reassess.    Yi Xiong DO: Progress note @2057- spoke with nursing supervisor. Patient with leukocytosis at [facility]- 38.5>43.5 today. Has been on Zosyn empirically since 12/25, unclear source. Patient to have CT a/p, blood culture added to workup.    Patient reassessed at 2:08AM: CT abdomen pelvis shows possible early pyelonephritis, UA positive, although possibly contaminated due to chronic indwelling Heart catheter.  Repeat WBC count 32 in ED, downtrending from 43 from earlier at [facility].  The patient has remained hemodynamically stable here he is afebrile, has no signs or symptoms of sepsis other than the leukocytosis.  I discussed the case with nursing supervisor, Irma, who will continue antibiotics at [facility].  patient to be transferred back to [facility], return to ED for new progressive or concerning symptoms.

## 2024-12-27 NOTE — ED PROVIDER NOTE - PATIENT PORTAL LINK FT
You can access the FollowMyHealth Patient Portal offered by [facility] by registering at the following website: [email or web address]. By joining [email or web address]’s FollowMyHealth portal, you will also be able to view your health information using other applications (apps) compatible with our system.

## 2024-12-27 NOTE — ED PROVIDER NOTE - OBJECTIVE STATEMENT
63yo male with PMH multiple sclerosis, CVA, BPH presenting from [facility] for elevated WBC on labs. Patient has no complaints.

## 2024-12-28 VITALS
SYSTOLIC BLOOD PRESSURE: 113 MMHG | HEART RATE: 87 BPM | TEMPERATURE: 99 F | DIASTOLIC BLOOD PRESSURE: 74 MMHG | RESPIRATION RATE: 18 BRPM | OXYGEN SATURATION: 96 %

## 2024-12-28 LAB
APPEARANCE UR: CLEAR — SIGNIFICANT CHANGE UP
BACTERIA # UR AUTO: NEGATIVE /HPF — SIGNIFICANT CHANGE UP
BILIRUB UR-MCNC: NEGATIVE — SIGNIFICANT CHANGE UP
COLOR SPEC: YELLOW — SIGNIFICANT CHANGE UP
DIFF PNL FLD: ABNORMAL
GLUCOSE UR QL: NEGATIVE MG/DL — SIGNIFICANT CHANGE UP
KETONES UR-MCNC: NEGATIVE MG/DL — SIGNIFICANT CHANGE UP
LEUKOCYTE ESTERASE UR-ACNC: ABNORMAL
NITRITE UR-MCNC: POSITIVE
PH UR: 7 — SIGNIFICANT CHANGE UP (ref 5–8)
PROT UR-MCNC: 100 MG/DL
RBC CASTS # UR COMP ASSIST: 48 /HPF — HIGH (ref 0–4)
SP GR SPEC: 1.03 — SIGNIFICANT CHANGE UP (ref 1–1.03)
SQUAMOUS # UR AUTO: 2 /HPF — SIGNIFICANT CHANGE UP (ref 0–5)
UROBILINOGEN FLD QL: 1 MG/DL — SIGNIFICANT CHANGE UP (ref 0.2–1)
WBC UR QL: 29 /HPF — HIGH (ref 0–5)

## 2024-12-28 PROCEDURE — 74177 CT ABD & PELVIS W/CONTRAST: CPT | Mod: MC

## 2024-12-28 PROCEDURE — 80053 COMPREHEN METABOLIC PANEL: CPT

## 2024-12-28 PROCEDURE — 85025 COMPLETE CBC W/AUTO DIFF WBC: CPT

## 2024-12-28 PROCEDURE — 99284 EMERGENCY DEPT VISIT MOD MDM: CPT | Mod: 25

## 2024-12-28 PROCEDURE — 87186 SC STD MICRODIL/AGAR DIL: CPT

## 2024-12-28 PROCEDURE — 87086 URINE CULTURE/COLONY COUNT: CPT

## 2024-12-28 PROCEDURE — 83605 ASSAY OF LACTIC ACID: CPT

## 2024-12-28 PROCEDURE — 36415 COLL VENOUS BLD VENIPUNCTURE: CPT

## 2024-12-28 PROCEDURE — 71260 CT THORAX DX C+: CPT | Mod: MC

## 2024-12-28 PROCEDURE — 71046 X-RAY EXAM CHEST 2 VIEWS: CPT

## 2024-12-28 PROCEDURE — 87077 CULTURE AEROBIC IDENTIFY: CPT

## 2024-12-28 PROCEDURE — 87040 BLOOD CULTURE FOR BACTERIA: CPT

## 2024-12-28 PROCEDURE — 0225U NFCT DS DNA&RNA 21 SARSCOV2: CPT

## 2024-12-28 PROCEDURE — 81001 URINALYSIS AUTO W/SCOPE: CPT

## 2024-12-28 NOTE — ED ADULT NURSE REASSESSMENT NOTE - NS ED NURSE REASSESS COMMENT FT1
Pt received from RN LOPEZ. Pt is resting in stretcher comfortably at this time, no apparent distress noted at this time. Pt safety maintained. Pt denies any complaints at this time. Respirations even & unlabored. Pt made aware of plan of care and verbalized understanding.

## 2024-12-30 LAB
-  AMIKACIN: SIGNIFICANT CHANGE UP
-  AZTREONAM: SIGNIFICANT CHANGE UP
-  CEFEPIME: SIGNIFICANT CHANGE UP
-  CEFTAZIDIME: SIGNIFICANT CHANGE UP
-  CIPROFLOXACIN: SIGNIFICANT CHANGE UP
-  IMIPENEM: SIGNIFICANT CHANGE UP
-  LEVOFLOXACIN: SIGNIFICANT CHANGE UP
-  MEROPENEM: SIGNIFICANT CHANGE UP
-  PIPERACILLIN/TAZOBACTAM: SIGNIFICANT CHANGE UP
CULTURE RESULTS: ABNORMAL
METHOD TYPE: SIGNIFICANT CHANGE UP
ORGANISM # SPEC MICROSCOPIC CNT: ABNORMAL
ORGANISM # SPEC MICROSCOPIC CNT: SIGNIFICANT CHANGE UP
SPECIMEN SOURCE: SIGNIFICANT CHANGE UP

## 2025-01-02 LAB
CULTURE RESULTS: SIGNIFICANT CHANGE UP
SPECIMEN SOURCE: SIGNIFICANT CHANGE UP

## 2025-01-30 ENCOUNTER — APPOINTMENT (OUTPATIENT)
Dept: UROLOGY | Facility: CLINIC | Age: 65
End: 2025-01-30

## 2025-02-04 ENCOUNTER — APPOINTMENT (OUTPATIENT)
Dept: UROLOGY | Facility: CLINIC | Age: 65
End: 2025-02-04

## 2025-02-04 ENCOUNTER — APPOINTMENT (OUTPATIENT)
Dept: UROLOGY | Facility: CLINIC | Age: 65
End: 2025-02-04
Payer: MEDICARE

## 2025-02-04 VITALS
OXYGEN SATURATION: 98 % | DIASTOLIC BLOOD PRESSURE: 66 MMHG | BODY MASS INDEX: 25.11 KG/M2 | SYSTOLIC BLOOD PRESSURE: 95 MMHG | HEIGHT: 67 IN | WEIGHT: 160 LBS | HEART RATE: 68 BPM | RESPIRATION RATE: 16 BRPM

## 2025-02-04 DIAGNOSIS — R39.9 UNSPECIFIED SYMPTOMS AND SIGNS INVOLVING THE GENITOURINARY SYSTEM: ICD-10-CM

## 2025-02-04 DIAGNOSIS — R33.9 RETENTION OF URINE, UNSPECIFIED: ICD-10-CM

## 2025-02-04 PROCEDURE — 99205 OFFICE O/P NEW HI 60 MIN: CPT | Mod: 25

## 2025-02-04 PROCEDURE — 51798 US URINE CAPACITY MEASURE: CPT

## 2025-02-04 PROCEDURE — 51702 INSERT TEMP BLADDER CATH: CPT

## 2025-02-04 RX ORDER — FINASTERIDE 5 MG/1
5 TABLET, FILM COATED ORAL DAILY
Qty: 90 | Refills: 3 | Status: ACTIVE | COMMUNITY
Start: 2025-02-04 | End: 1900-01-01

## 2025-02-04 RX ORDER — TAMSULOSIN HYDROCHLORIDE 0.4 MG/1
0.4 CAPSULE ORAL
Qty: 90 | Refills: 3 | Status: ACTIVE | COMMUNITY
Start: 2025-02-04 | End: 1900-01-01

## 2025-03-04 ENCOUNTER — APPOINTMENT (OUTPATIENT)
Dept: UROLOGY | Facility: CLINIC | Age: 65
End: 2025-03-04
Payer: MEDICARE

## 2025-03-04 PROCEDURE — 51702 INSERT TEMP BLADDER CATH: CPT

## 2025-04-10 ENCOUNTER — APPOINTMENT (OUTPATIENT)
Dept: UROLOGY | Facility: CLINIC | Age: 65
End: 2025-04-10
Payer: MEDICARE

## 2025-04-10 PROCEDURE — 99213 OFFICE O/P EST LOW 20 MIN: CPT | Mod: 25

## 2025-04-10 PROCEDURE — A4216: CPT | Mod: NC

## 2025-04-10 PROCEDURE — 51798 US URINE CAPACITY MEASURE: CPT

## 2025-04-10 PROCEDURE — 51702 INSERT TEMP BLADDER CATH: CPT

## 2025-05-06 ENCOUNTER — APPOINTMENT (OUTPATIENT)
Dept: UROLOGY | Facility: CLINIC | Age: 65
End: 2025-05-06
Payer: MEDICARE

## 2025-05-06 PROCEDURE — 51784 ANAL/URINARY MUSCLE STUDY: CPT

## 2025-05-06 PROCEDURE — 51728 CYSTOMETROGRAM W/VP: CPT

## 2025-05-06 PROCEDURE — 99215 OFFICE O/P EST HI 40 MIN: CPT | Mod: 25

## 2025-05-06 PROCEDURE — 51797 INTRAABDOMINAL PRESSURE TEST: CPT

## 2025-05-09 LAB — BACTERIA UR CULT: NORMAL

## 2025-06-16 ENCOUNTER — APPOINTMENT (OUTPATIENT)
Dept: UROLOGY | Facility: CLINIC | Age: 65
End: 2025-06-16
Payer: MEDICARE

## 2025-06-16 PROCEDURE — 51102 DRAIN BL W/CATH INSERTION: CPT

## 2025-07-24 ENCOUNTER — APPOINTMENT (OUTPATIENT)
Dept: UROLOGY | Facility: CLINIC | Age: 65
End: 2025-07-24
Payer: MEDICARE

## 2025-07-24 PROCEDURE — 99214 OFFICE O/P EST MOD 30 MIN: CPT | Mod: 25

## 2025-07-24 PROCEDURE — 51710 CHANGE OF BLADDER TUBE: CPT

## 2025-08-26 ENCOUNTER — APPOINTMENT (OUTPATIENT)
Dept: UROLOGY | Facility: CLINIC | Age: 65
End: 2025-08-26

## 2025-08-26 VITALS
SYSTOLIC BLOOD PRESSURE: 108 MMHG | WEIGHT: 165 LBS | DIASTOLIC BLOOD PRESSURE: 75 MMHG | BODY MASS INDEX: 25.01 KG/M2 | HEIGHT: 68 IN | HEART RATE: 75 BPM

## (undated) DEVICE — DRAPE SHOWER CURTAIN ISOLATION

## (undated) DEVICE — SUT VICRYL 0 36" CT-1 UNDYED

## (undated) DEVICE — DRAPE XL SHEET 77X98"

## (undated) DEVICE — PACK MINOR WITH LAP

## (undated) DEVICE — SOL IRR POUR H2O 1000ML

## (undated) DEVICE — DRSG DERMABOND 0.7ML

## (undated) DEVICE — WARMING BLANKET UPPER ADULT

## (undated) DEVICE — DRSG KERLIX MED 6"

## (undated) DEVICE — SOL IRR POUR NS 0.9% 1000ML

## (undated) DEVICE — SUT STRATAFIX SPIRAL MONOCRYL PLUS 3-0 30CM PS-2 UNDYED

## (undated) DEVICE — VENODYNE/SCD SLEEVE CALF MEDIUM

## (undated) DEVICE — DRAPE C ARM UNIVERSAL

## (undated) DEVICE — GLV 8 PROTEXIS ORTHO (BROWN)

## (undated) DEVICE — SUT VICRYL 2-0 27" CT-2 UNDYED

## (undated) DEVICE — GLV 8 PROTEXIS (BLUE)

## (undated) DEVICE — GUIDEWIRE SYNTHES 3.2MM X 400MM